# Patient Record
Sex: MALE | Race: WHITE | NOT HISPANIC OR LATINO | Employment: FULL TIME | URBAN - METROPOLITAN AREA
[De-identification: names, ages, dates, MRNs, and addresses within clinical notes are randomized per-mention and may not be internally consistent; named-entity substitution may affect disease eponyms.]

---

## 2020-10-09 ENCOUNTER — OFFICE VISIT (OUTPATIENT)
Dept: URGENT CARE | Facility: CLINIC | Age: 55
End: 2020-10-09
Payer: COMMERCIAL

## 2020-10-09 VITALS
OXYGEN SATURATION: 98 % | RESPIRATION RATE: 16 BRPM | HEART RATE: 115 BPM | WEIGHT: 178 LBS | HEIGHT: 70 IN | DIASTOLIC BLOOD PRESSURE: 90 MMHG | BODY MASS INDEX: 25.48 KG/M2 | TEMPERATURE: 98.6 F | SYSTOLIC BLOOD PRESSURE: 140 MMHG

## 2020-10-09 DIAGNOSIS — G89.18 ACUTE POST-OPERATIVE PAIN: ICD-10-CM

## 2020-10-09 PROCEDURE — 99203 OFFICE O/P NEW LOW 30 MIN: CPT | Performed by: NURSE PRACTITIONER

## 2020-10-09 RX ORDER — HYDROCODONE BITARTRATE AND ACETAMINOPHEN 5; 325 MG/1; MG/1
2 TABLET ORAL EVERY 6 HOURS PRN
Qty: 24 TAB | Refills: 0 | Status: SHIPPED | OUTPATIENT
Start: 2020-10-09 | End: 2020-10-12

## 2020-10-09 ASSESSMENT — ENCOUNTER SYMPTOMS
SENSORY CHANGE: 0
TINGLING: 0
FOCAL WEAKNESS: 0
BACK PAIN: 1

## 2020-10-09 NOTE — PROGRESS NOTES
Subjective:      Isma Garcia is a 55 y.o. male who presents with Back Pain (disk replacement, infusion, req pain meds)            HPI New. This is a 55 year old male with history of back surgery around 20 Sept in [place]. He is in between moves from [place] to [place] and has no PCP here yet. Has been having quite a bit of post procedural pain, especially when laying down. Denies bowel or bladder issues, pelvic pain or paresthesia. Denies weakness. Has been taking quite of bit of tylenol for this with minimal relief.  Patient has no known allergies.  Current Outpatient Medications on File Prior to Visit   Medication Sig Dispense Refill   • lisinopril (PRINIVIL) 20 MG TABS Take 1 Tab by mouth every day. 30 Each 0   • valacyclovir (VALTREX) 500 MG TABS Take 1 Tab by mouth every day. 30 Each 5     No current facility-administered medications on file prior to visit.      Social History     Socioeconomic History   • Marital status:      Spouse name: Not on file   • Number of children: 2   • Years of education: Not on file   • Highest education level: Not on file   Occupational History   • Occupation:      Employer: [employer]   Social Needs   • Financial resource strain: Not on file   • Food insecurity     Worry: Not on file     Inability: Not on file   • Transportation needs     Medical: Not on file     Non-medical: Not on file   Tobacco Use   • Smoking status: Never Smoker   • Smokeless tobacco: Never Used   Substance and Sexual Activity   • Alcohol use: No     Comment: occaisional   • Drug use: No   • Sexual activity: Yes   Lifestyle   • Physical activity     Days per week: Not on file     Minutes per session: Not on file   • Stress: Not on file   Relationships   • Social connections     Talks on phone: Not on file     Gets together: Not on file     Attends Denominational service: Not on file     Active member of club or organization: Not on file     Attends meetings of clubs or organizations: Not on file      "Relationship status: Not on file   • Intimate partner violence     Fear of current or ex partner: Not on file     Emotionally abused: Not on file     Physically abused: Not on file     Forced sexual activity: Not on file   Other Topics Concern   • Not on file   Social History Narrative   • Not on file     Breast Cancer-related family history is not on file.      Review of Systems   Genitourinary: Negative.    Musculoskeletal: Positive for back pain.   Neurological: Negative for tingling, sensory change and focal weakness.          Objective:     /90 (BP Location: Left arm, Patient Position: Sitting, BP Cuff Size: Adult)   Pulse (!) 115   Temp 37 °C (98.6 °F) (Temporal)   Resp 16   Ht 1.778 m (5' 10\")   Wt 80.7 kg (178 lb)   SpO2 98%   BMI 25.54 kg/m²      Physical Exam  Vitals signs and nursing note reviewed.   Constitutional:       General: He is not in acute distress.     Appearance: He is well-developed.   Cardiovascular:      Rate and Rhythm: Normal rate and regular rhythm.      Heart sounds: Normal heart sounds. No murmur.   Pulmonary:      Effort: Pulmonary effort is normal.      Breath sounds: Normal breath sounds.   Musculoskeletal:      Lumbar back: He exhibits decreased range of motion, tenderness and pain. He exhibits no swelling and no spasm.   Skin:     General: Skin is warm and dry.   Neurological:      General: No focal deficit present.      Mental Status: He is alert.      Sensory: No sensory deficit.      Motor: No abnormal muscle tone.      Gait: Gait normal.                 Assessment/Plan:        1. Acute post-operative pain  HYDROcodone-acetaminophen (NORCO) 5-325 MG Tab per tablet     Limited amount of norco for use.  Patient is cautioned on sedation potential of narcotic medication; no drinking, driving or operating heavy machinery while on this medication.  PDMP reviewed, no information.  Patient aware that after this we will not refill.  He has follow up with surgeon in 3 weeks. " (located in [place])

## 2021-03-15 DIAGNOSIS — Z23 NEED FOR VACCINATION: ICD-10-CM

## 2021-05-04 LAB — HBA1C MFR BLD: 8.7 % (ref 0–5.6)

## 2021-06-10 ENCOUNTER — APPOINTMENT (RX ONLY)
Dept: URBAN - METROPOLITAN AREA CLINIC 22 | Facility: CLINIC | Age: 56
Setting detail: DERMATOLOGY
End: 2021-06-10

## 2021-06-10 DIAGNOSIS — L81.4 OTHER MELANIN HYPERPIGMENTATION: ICD-10-CM

## 2021-06-10 DIAGNOSIS — L82.1 OTHER SEBORRHEIC KERATOSIS: ICD-10-CM

## 2021-06-10 DIAGNOSIS — D18.0 HEMANGIOMA: ICD-10-CM

## 2021-06-10 DIAGNOSIS — D22 MELANOCYTIC NEVI: ICD-10-CM

## 2021-06-10 DIAGNOSIS — Z85.828 PERSONAL HISTORY OF OTHER MALIGNANT NEOPLASM OF SKIN: ICD-10-CM

## 2021-06-10 DIAGNOSIS — L56.8 OTHER SPECIFIED ACUTE SKIN CHANGES DUE TO ULTRAVIOLET RADIATION: ICD-10-CM

## 2021-06-10 DIAGNOSIS — Z71.89 OTHER SPECIFIED COUNSELING: ICD-10-CM

## 2021-06-10 DIAGNOSIS — L57.0 ACTINIC KERATOSIS: ICD-10-CM

## 2021-06-10 DIAGNOSIS — L82.0 INFLAMED SEBORRHEIC KERATOSIS: ICD-10-CM

## 2021-06-10 PROBLEM — D22.5 MELANOCYTIC NEVI OF TRUNK: Status: ACTIVE | Noted: 2021-06-10

## 2021-06-10 PROBLEM — D18.01 HEMANGIOMA OF SKIN AND SUBCUTANEOUS TISSUE: Status: ACTIVE | Noted: 2021-06-10

## 2021-06-10 PROCEDURE — ? COUNSELING

## 2021-06-10 PROCEDURE — 17004 DESTROY PREMAL LESIONS 15/>: CPT

## 2021-06-10 PROCEDURE — ? LIQUID NITROGEN

## 2021-06-10 PROCEDURE — 17110 DESTRUCTION B9 LES UP TO 14: CPT | Mod: 59

## 2021-06-10 PROCEDURE — 99203 OFFICE O/P NEW LOW 30 MIN: CPT | Mod: 25

## 2021-06-10 PROCEDURE — ? SUNSCREEN RECOMMENDATIONS

## 2021-06-10 ASSESSMENT — LOCATION DETAILED DESCRIPTION DERM
LOCATION DETAILED: RIGHT MEDIAL ZYGOMA
LOCATION DETAILED: LEFT SUPERIOR UPPER BACK
LOCATION DETAILED: LEFT FOREHEAD
LOCATION DETAILED: RIGHT VENTRAL DISTAL FOREARM
LOCATION DETAILED: LEFT INFERIOR FOREHEAD
LOCATION DETAILED: LEFT ULNAR DORSAL HAND
LOCATION DETAILED: RIGHT SUPERIOR PARIETAL SCALP
LOCATION DETAILED: RIGHT ULNAR DORSAL HAND
LOCATION DETAILED: RIGHT CENTRAL FRONTAL SCALP
LOCATION DETAILED: RIGHT SUPERIOR MEDIAL UPPER BACK
LOCATION DETAILED: RIGHT SUPERIOR UPPER BACK
LOCATION DETAILED: LEFT CENTRAL FRONTAL SCALP
LOCATION DETAILED: LEFT SUPERIOR LATERAL UPPER BACK
LOCATION DETAILED: LEFT ANTERIOR DISTAL THIGH
LOCATION DETAILED: EPIGASTRIC SKIN
LOCATION DETAILED: LEFT POSTERIOR SHOULDER
LOCATION DETAILED: NASAL DORSUM
LOCATION DETAILED: LEFT SUPERIOR PARIETAL SCALP
LOCATION DETAILED: LEFT CENTRAL PARIETAL SCALP
LOCATION DETAILED: RIGHT RADIAL DORSAL HAND
LOCATION DETAILED: LEFT DISTAL DORSAL FOREARM
LOCATION DETAILED: LEFT LATERAL FOREHEAD
LOCATION DETAILED: 2ND WEB SPACE LEFT HAND
LOCATION DETAILED: RIGHT INFERIOR FOREHEAD
LOCATION DETAILED: LEFT SUPERIOR MEDIAL UPPER BACK
LOCATION DETAILED: RIGHT VENTRAL PROXIMAL FOREARM
LOCATION DETAILED: RIGHT SUPERIOR MEDIAL MIDBACK
LOCATION DETAILED: LEFT MEDIAL TRAPEZIAL NECK
LOCATION DETAILED: LEFT ANTERIOR SHOULDER
LOCATION DETAILED: RIGHT POSTERIOR SHOULDER

## 2021-06-10 ASSESSMENT — LOCATION ZONE DERM
LOCATION ZONE: HAND
LOCATION ZONE: NECK
LOCATION ZONE: TRUNK
LOCATION ZONE: ARM
LOCATION ZONE: FACE
LOCATION ZONE: NOSE
LOCATION ZONE: SCALP
LOCATION ZONE: LEG

## 2021-06-10 ASSESSMENT — LOCATION SIMPLE DESCRIPTION DERM
LOCATION SIMPLE: RIGHT ZYGOMA
LOCATION SIMPLE: ABDOMEN
LOCATION SIMPLE: RIGHT UPPER BACK
LOCATION SIMPLE: RIGHT HAND
LOCATION SIMPLE: LEFT HAND
LOCATION SIMPLE: LEFT FOREARM
LOCATION SIMPLE: RIGHT FOREHEAD
LOCATION SIMPLE: LEFT UPPER BACK
LOCATION SIMPLE: RIGHT SHOULDER
LOCATION SIMPLE: LEFT SCALP
LOCATION SIMPLE: RIGHT LOWER BACK
LOCATION SIMPLE: SCALP
LOCATION SIMPLE: POSTERIOR NECK
LOCATION SIMPLE: LEFT THIGH
LOCATION SIMPLE: LEFT FOREHEAD
LOCATION SIMPLE: RIGHT FOREARM
LOCATION SIMPLE: LEFT SHOULDER
LOCATION SIMPLE: RIGHT SCALP
LOCATION SIMPLE: NOSE

## 2021-06-10 ASSESSMENT — BSA RASH: BSA RASH: 50

## 2021-06-10 NOTE — HPI: EVALUATION OF SKIN LESION(S)
Appointment in May ok to fill per Md  
How Severe Are Your Spot(S)?: moderate
Have Your Spot(S) Been Treated In The Past?: has not been treated
Hpi Title: Evaluation of Skin Lesions

## 2021-06-10 NOTE — PROCEDURE: LIQUID NITROGEN
Render Note In Bullet Format When Appropriate: No
Detail Level: Detailed
Duration Of Freeze Thaw-Cycle (Seconds): 3
Number Of Freeze-Thaw Cycles: 2 freeze-thaw cycles
Post-Care Instructions: I reviewed with the patient in detail post-care instructions. Patient is to wear sunprotection, and avoid picking at any of the treated lesions. Pt may apply Vaseline to crusted or scabbing areas.
Consent: The patient's consent was obtained including but not limited to risks of crusting, scabbing, blistering, scarring, darker or lighter pigmentary change, recurrence, incomplete removal and infection.
Render Post-Care Instructions In Note?: yes
Number Of Freeze-Thaw Cycles: 3 freeze-thaw cycles
Medical Necessity Clause: This procedure was medically necessary because the lesions that were treated were:
Medical Necessity Information: It is in your best interest to select a reason for this procedure from the list below. All of these items fulfill various CMS LCD requirements except the new and changing color options.

## 2021-08-16 ENCOUNTER — TELEPHONE (OUTPATIENT)
Dept: SCHEDULING | Facility: IMAGING CENTER | Age: 56
End: 2021-08-16

## 2021-08-18 ENCOUNTER — TELEMEDICINE (OUTPATIENT)
Dept: MEDICAL GROUP | Facility: MEDICAL CENTER | Age: 56
End: 2021-08-18
Payer: COMMERCIAL

## 2021-08-18 VITALS — BODY MASS INDEX: 27.2 KG/M2 | HEIGHT: 70 IN | WEIGHT: 190 LBS

## 2021-08-18 DIAGNOSIS — G47.26 SHIFT WORK SLEEP DISORDER: ICD-10-CM

## 2021-08-18 DIAGNOSIS — E11.9 TYPE 2 DIABETES MELLITUS WITHOUT COMPLICATION, WITHOUT LONG-TERM CURRENT USE OF INSULIN (HCC): ICD-10-CM

## 2021-08-18 DIAGNOSIS — M19.012 PRIMARY OSTEOARTHRITIS OF BOTH SHOULDERS: ICD-10-CM

## 2021-08-18 DIAGNOSIS — M1A.0710 CHRONIC IDIOPATHIC GOUT INVOLVING TOE OF RIGHT FOOT WITHOUT TOPHUS: ICD-10-CM

## 2021-08-18 DIAGNOSIS — A60.00 GENITAL HERPES SIMPLEX, UNSPECIFIED SITE: ICD-10-CM

## 2021-08-18 DIAGNOSIS — N52.9 ERECTILE DYSFUNCTION, UNSPECIFIED ERECTILE DYSFUNCTION TYPE: ICD-10-CM

## 2021-08-18 DIAGNOSIS — M19.011 PRIMARY OSTEOARTHRITIS OF BOTH SHOULDERS: ICD-10-CM

## 2021-08-18 DIAGNOSIS — M54.42 CHRONIC MIDLINE LOW BACK PAIN WITH BILATERAL SCIATICA: ICD-10-CM

## 2021-08-18 DIAGNOSIS — G89.29 CHRONIC MIDLINE LOW BACK PAIN WITH BILATERAL SCIATICA: ICD-10-CM

## 2021-08-18 DIAGNOSIS — D75.1 POLYCYTHEMIA: ICD-10-CM

## 2021-08-18 DIAGNOSIS — M54.41 CHRONIC MIDLINE LOW BACK PAIN WITH BILATERAL SCIATICA: ICD-10-CM

## 2021-08-18 DIAGNOSIS — E83.110 HEREDITARY HEMOCHROMATOSIS (HCC): ICD-10-CM

## 2021-08-18 DIAGNOSIS — I10 ESSENTIAL HYPERTENSION, BENIGN: ICD-10-CM

## 2021-08-18 PROBLEM — M19.90 OSTEOARTHRITIS: Status: ACTIVE | Noted: 2021-08-18

## 2021-08-18 PROCEDURE — 99214 OFFICE O/P EST MOD 30 MIN: CPT | Mod: 95,CR | Performed by: INTERNAL MEDICINE

## 2021-08-18 RX ORDER — VALACYCLOVIR HYDROCHLORIDE 500 MG/1
500 TABLET, FILM COATED ORAL 2 TIMES DAILY
Qty: 90 TABLET | Refills: 0 | Status: SHIPPED | OUTPATIENT
Start: 2021-08-18 | End: 2022-08-10

## 2021-08-18 RX ORDER — MELOXICAM 15 MG/1
15 TABLET ORAL DAILY
COMMUNITY
End: 2021-08-18 | Stop reason: SDUPTHER

## 2021-08-18 RX ORDER — SILDENAFIL CITRATE 20 MG/1
TABLET ORAL
Qty: 90 TABLET | Refills: 0 | Status: SHIPPED | OUTPATIENT
Start: 2021-08-18 | End: 2021-09-10

## 2021-08-18 RX ORDER — LISINOPRIL 20 MG/1
20 TABLET ORAL DAILY
Qty: 90 TABLET | Refills: 0 | Status: SHIPPED | OUTPATIENT
Start: 2021-08-18 | End: 2021-09-10 | Stop reason: SDUPTHER

## 2021-08-18 RX ORDER — MODAFINIL 200 MG/1
200 TABLET ORAL
Qty: 10 TABLET | Refills: 0 | Status: SHIPPED | OUTPATIENT
Start: 2021-08-18 | End: 2021-08-28

## 2021-08-18 RX ORDER — MELOXICAM 15 MG/1
15 TABLET ORAL DAILY
Qty: 90 TABLET | Refills: 0 | Status: SHIPPED | OUTPATIENT
Start: 2021-08-18 | End: 2021-09-10

## 2021-08-18 RX ORDER — SILDENAFIL CITRATE 20 MG/1
TABLET ORAL
COMMUNITY
Start: 2021-06-27 | End: 2021-08-18 | Stop reason: SDUPTHER

## 2021-08-31 LAB
ALBUMIN SERPL-MCNC: 4.6 G/DL (ref 3.8–4.9)
ALBUMIN/CREAT UR: 4 MG/G CREAT (ref 0–29)
ALBUMIN/GLOB SERPL: 2 {RATIO} (ref 1.2–2.2)
ALP SERPL-CCNC: 65 IU/L (ref 48–121)
ALT SERPL-CCNC: 23 IU/L (ref 0–44)
AST SERPL-CCNC: 24 IU/L (ref 0–40)
BASOPHILS # BLD AUTO: 0 X10E3/UL (ref 0–0.2)
BASOPHILS NFR BLD AUTO: 1 %
BILIRUB SERPL-MCNC: 0.9 MG/DL (ref 0–1.2)
BUN SERPL-MCNC: 20 MG/DL (ref 6–24)
BUN/CREAT SERPL: 16 (ref 9–20)
CALCIUM SERPL-MCNC: 9.3 MG/DL (ref 8.7–10.2)
CHLORIDE SERPL-SCNC: 102 MMOL/L (ref 96–106)
CHOLEST SERPL-MCNC: 141 MG/DL (ref 100–199)
CO2 SERPL-SCNC: 22 MMOL/L (ref 20–29)
CREAT SERPL-MCNC: 1.29 MG/DL (ref 0.76–1.27)
CREAT UR-MCNC: 160.9 MG/DL
EOSINOPHIL # BLD AUTO: 0.2 X10E3/UL (ref 0–0.4)
EOSINOPHIL NFR BLD AUTO: 3 %
ERYTHROCYTE [DISTWIDTH] IN BLOOD BY AUTOMATED COUNT: 13.6 % (ref 11.6–15.4)
GLOBULIN SER CALC-MCNC: 2.3 G/DL (ref 1.5–4.5)
GLUCOSE SERPL-MCNC: 95 MG/DL (ref 65–99)
HBA1C MFR BLD: 5.5 % (ref 4.8–5.6)
HCT VFR BLD AUTO: 59 % (ref 37.5–51)
HDLC SERPL-MCNC: 54 MG/DL
HGB BLD-MCNC: 19.7 G/DL (ref 13–17.7)
IMM GRANULOCYTES # BLD AUTO: 0 X10E3/UL (ref 0–0.1)
IMM GRANULOCYTES NFR BLD AUTO: 1 %
IMMATURE CELLS  115398: ABNORMAL
LABORATORY COMMENT REPORT: NORMAL
LDLC SERPL CALC-MCNC: 72 MG/DL (ref 0–99)
LYMPHOCYTES # BLD AUTO: 1.2 X10E3/UL (ref 0.7–3.1)
LYMPHOCYTES NFR BLD AUTO: 25 %
MCH RBC QN AUTO: 34.7 PG (ref 26.6–33)
MCHC RBC AUTO-ENTMCNC: 33.4 G/DL (ref 31.5–35.7)
MCV RBC AUTO: 104 FL (ref 79–97)
MICROALBUMIN UR-MCNC: 6.2 UG/ML
MONOCYTES # BLD AUTO: 0.5 X10E3/UL (ref 0.1–0.9)
MONOCYTES NFR BLD AUTO: 11 %
MORPHOLOGY BLD-IMP: ABNORMAL
NEUTROPHILS # BLD AUTO: 2.9 X10E3/UL (ref 1.4–7)
NEUTROPHILS NFR BLD AUTO: 59 %
NRBC BLD AUTO-RTO: ABNORMAL %
PLATELET # BLD AUTO: 185 X10E3/UL (ref 150–450)
POTASSIUM SERPL-SCNC: 4.7 MMOL/L (ref 3.5–5.2)
PROT SERPL-MCNC: 6.9 G/DL (ref 6–8.5)
RBC # BLD AUTO: 5.67 X10E6/UL (ref 4.14–5.8)
SODIUM SERPL-SCNC: 140 MMOL/L (ref 134–144)
TRIGL SERPL-MCNC: 76 MG/DL (ref 0–149)
TSH SERPL DL<=0.005 MIU/L-ACNC: 2.3 UIU/ML (ref 0.45–4.5)
URATE SERPL-MCNC: 8.5 MG/DL (ref 3.8–8.4)
VLDLC SERPL CALC-MCNC: 15 MG/DL (ref 5–40)
WBC # BLD AUTO: 4.8 X10E3/UL (ref 3.4–10.8)

## 2021-09-10 ENCOUNTER — OFFICE VISIT (OUTPATIENT)
Dept: MEDICAL GROUP | Facility: MEDICAL CENTER | Age: 56
End: 2021-09-10
Payer: COMMERCIAL

## 2021-09-10 VITALS
DIASTOLIC BLOOD PRESSURE: 84 MMHG | TEMPERATURE: 97.7 F | OXYGEN SATURATION: 97 % | HEIGHT: 70 IN | BODY MASS INDEX: 27.69 KG/M2 | WEIGHT: 193.4 LBS | HEART RATE: 92 BPM | SYSTOLIC BLOOD PRESSURE: 114 MMHG

## 2021-09-10 DIAGNOSIS — N52.9 ERECTILE DYSFUNCTION, UNSPECIFIED ERECTILE DYSFUNCTION TYPE: ICD-10-CM

## 2021-09-10 DIAGNOSIS — R06.81 APNEA: ICD-10-CM

## 2021-09-10 DIAGNOSIS — D75.1 POLYCYTHEMIA: ICD-10-CM

## 2021-09-10 DIAGNOSIS — I10 ESSENTIAL HYPERTENSION, BENIGN: ICD-10-CM

## 2021-09-10 DIAGNOSIS — R79.89 ELEVATED SERUM CREATININE: ICD-10-CM

## 2021-09-10 DIAGNOSIS — M25.512 ACUTE PAIN OF LEFT SHOULDER: ICD-10-CM

## 2021-09-10 DIAGNOSIS — R06.83 LOUD SNORING: ICD-10-CM

## 2021-09-10 DIAGNOSIS — M1A.0710 CHRONIC IDIOPATHIC GOUT INVOLVING TOE OF RIGHT FOOT WITHOUT TOPHUS: ICD-10-CM

## 2021-09-10 DIAGNOSIS — E29.1 HYPOGONADISM, MALE: ICD-10-CM

## 2021-09-10 PROCEDURE — 99214 OFFICE O/P EST MOD 30 MIN: CPT | Performed by: INTERNAL MEDICINE

## 2021-09-10 RX ORDER — PREDNISONE 20 MG/1
40 TABLET ORAL DAILY
Qty: 10 TABLET | Refills: 0 | Status: SHIPPED | OUTPATIENT
Start: 2021-09-10 | End: 2021-09-15

## 2021-09-10 RX ORDER — ALLOPURINOL 100 MG/1
200 TABLET ORAL DAILY
Qty: 180 TABLET | Refills: 1 | Status: SHIPPED | OUTPATIENT
Start: 2021-09-10 | End: 2022-03-08

## 2021-09-10 RX ORDER — COLCHICINE 0.6 MG/1
0.6 TABLET ORAL DAILY
Qty: 90 TABLET | Refills: 1 | Status: SHIPPED | OUTPATIENT
Start: 2021-09-10 | End: 2022-03-08

## 2021-09-10 RX ORDER — LISINOPRIL 20 MG/1
20 TABLET ORAL DAILY
Qty: 90 TABLET | Refills: 3 | Status: SHIPPED | OUTPATIENT
Start: 2021-09-10 | End: 2022-08-05 | Stop reason: SDUPTHER

## 2021-09-10 RX ORDER — HYDROCODONE BITARTRATE AND ACETAMINOPHEN 5; 325 MG/1; MG/1
1 TABLET ORAL 2 TIMES DAILY PRN
Qty: 28 TABLET | Refills: 0 | Status: SHIPPED | OUTPATIENT
Start: 2021-09-10 | End: 2021-09-24

## 2021-09-10 RX ORDER — SILDENAFIL CITRATE 20 MG/1
TABLET ORAL
Qty: 90 TABLET | Refills: 3 | Status: SHIPPED | OUTPATIENT
Start: 2021-09-10 | End: 2024-01-05

## 2021-09-10 ASSESSMENT — FIBROSIS 4 INDEX: FIB4 SCORE: 1.54

## 2021-09-10 ASSESSMENT — PATIENT HEALTH QUESTIONNAIRE - PHQ9: CLINICAL INTERPRETATION OF PHQ2 SCORE: 0

## 2021-09-10 NOTE — PROGRESS NOTES
Established Patient    Isma presents today with the following:    CC: Lab review, shoulder pain    HPI:   Isma is a 56 y.o. male who came in for above.    He has significant polycythemia, hemoglobin 19.7, hematocrit 59.  Since he saw the result, he has been cutting down testosterone prescribed by Dr. Jose Lau.     He regularly gets phlebotomy for hemochromatosis, next 1 is in a month.  He currently does not have any symptoms of stasis.     He also have history of snoring and apnea noted by a nurse during an overnight stay at the hospital.  He was referred to sleep medicine but could not get in yet.    Elevated serum creatinine without reduced EGFR.  He was off meloxicam until he got to gout flareups recently.     His left shoulder osteoarthritis has been flaring up in recent intra-articular steroid injection did not work.  He needs shoulder replacement but currently reluctant to proceed with this.          ROS:   As above    Patient Active Problem List    Diagnosis Date Noted   • Hypogonadism, male 09/10/2021   • Primary osteoarthritis of both shoulders 08/18/2021   • Shift work sleep disorder 08/18/2021   • Genital herpes 08/18/2021   • Erectile dysfunction 01/10/2013   • Left anterior fascicular block 01/10/2013   • Essential hypertension, benign 01/10/2013   • Hereditary hemochromatosis (HCC) 09/19/2012   • Gout    • Chronic low back pain with bilateral sciatica    • History of basal cell carcinoma (BCC)        Current Outpatient Medications   Medication Sig Dispense Refill   • colchicine (COLCRYS) 0.6 MG Tab Take 1 Tablet by mouth every day. 90 Tablet 1   • allopurinol (ZYLOPRIM) 100 MG Tab Take 2 Tablets by mouth every day. 180 Tablet 1   • predniSONE (DELTASONE) 20 MG Tab Take 2 Tablets by mouth every day for 5 days. For gout flare up 10 Tablet 0   • HYDROcodone-acetaminophen (NORCO) 5-325 MG Tab per tablet Take 1 Tablet by mouth 2 times a day as needed for up to 14 days. 28 Tablet 0   •  "sildenafil (REVATIO) 20 MG tablet TAKE 2 TO 5 TABLETS ORALLY ONE HOUR PRIOR TO INTERCOURSE 90 Tablet 3   • lisinopril (PRINIVIL) 20 MG Tab Take 1 Tablet by mouth every day. 90 Tablet 3   • valACYclovir (VALTREX) 500 MG Tab Take 1 Tablet by mouth 2 times a day. 3- 5 DAYS FOR EACH HERPES FLARE-UP 90 Tablet 0     No current facility-administered medications for this visit.         /84 (BP Location: Left arm, Patient Position: Sitting, BP Cuff Size: Adult)   Pulse 92   Temp 36.5 °C (97.7 °F) (Temporal)   Ht 1.778 m (5' 10\")   Wt 87.7 kg (193 lb 6.4 oz)   SpO2 97%   BMI 27.75 kg/m²     Physical Exam  General: Alert and oriented, No apparent distress.  Eyes: Pupils are equal and reactive. No scleral icterus.  Neck: Supple. No cervical or supraclavicular lymphadenopathy noted. Thyroid not enlarged.  Neck circumference 16 \"  Lungs: Clear to auscultation bilaterally without any wheezing, crepitations.  Cardiovascular: Regular rate and rhythm. No murmurs, rubs or gallops.  Abdomen: Bowel sound +, soft, non tender, no rebound or guarding, no palpable organomegaly  Extremities: No edema.  Skin: No rash or suspicious skin lesions noted.  Neuro: A & O x 4. Normal speech and delayed in word finding.        Assessment and Plan    1. Polycythemia  Recommended to start aspirin 81 mg daily.  Screen for sleep apnea..  -  CBC WITH DIFFERENTIAL; Future    2. Apnea  3. Loud snoring  -We will order home sleep study.     4. Hypogonadism, male  - Continue with reduced dose of testosterone.      5. Chronic idiopathic gout involving toe of right foot without tophus  Uric acid level not at target.  We will start allopurinol.  Due to recent frequent flareups, start colchicine to avoid flareup when starting allopurinol.  Avoid NSAIDs for now.  If there is flareup, use prednisone for 5 days.  Possible side effects of these medications discussed.  - colchicine (COLCRYS) 0.6 MG Tab; Take 1 Tablet by mouth every day.  Dispense: 90 Tablet; " Refill: 1  - allopurinol (ZYLOPRIM) 100 MG Tab; Take 2 Tablets by mouth every day.  Dispense: 180 Tablet; Refill: 1  - predniSONE (DELTASONE) 20 MG Tab; Take 2 Tablets by mouth every day for 5 days. For gout flare up  Dispense: 10 Tablet; Refill: 0  - Comp Metabolic Panel; Future  - URIC ACID; Future    6. Elevated serum creatinine  -Stop meloxicam. Recheck in 3 months.    7. Acute pain of left shoulder  Due to elevated creatinine, inadequate relief with Tylenol or intra-articular injection, try short-term course of HYDROcodone-acetaminophen (NORCO) 5-325 MG Tab per tablet; Take 1 Tablet by mouth 2 times a day as needed for up to 14 days.  Dispense: 28 Tablet; Refill: 0  -Discussed side effects of opiate medications.    8. Essential hypertension, benign  - stable. refilled lisinopril (PRINIVIL) 20 MG Tab; Take 1 Tablet by mouth every day.  Dispense: 90 Tablet; Refill: 3    9. Erectile dysfunction, unspecified erectile dysfunction type  - worsening. Treat possible underlying cause such as CLOVIS.  refilled sildenafil (REVATIO) 20 MG tablet; TAKE 2 TO 5 TABLETS ORALLY ONE HOUR PRIOR TO INTERCOURSE  Dispense: 90 Tablet; Refill: 3        Return in about 3 months (around 12/10/2021).         Signed by: Maria Elena Latif M.D.

## 2021-11-16 DIAGNOSIS — G47.33 SEVERE OBSTRUCTIVE SLEEP APNEA: ICD-10-CM

## 2022-02-08 ENCOUNTER — OFFICE VISIT (OUTPATIENT)
Dept: SLEEP MEDICINE | Facility: MEDICAL CENTER | Age: 57
End: 2022-02-08
Payer: COMMERCIAL

## 2022-02-08 VITALS
HEIGHT: 70 IN | BODY MASS INDEX: 27.92 KG/M2 | RESPIRATION RATE: 16 BRPM | OXYGEN SATURATION: 95 % | HEART RATE: 104 BPM | DIASTOLIC BLOOD PRESSURE: 82 MMHG | SYSTOLIC BLOOD PRESSURE: 124 MMHG | WEIGHT: 195 LBS

## 2022-02-08 DIAGNOSIS — G47.33 OSA (OBSTRUCTIVE SLEEP APNEA): ICD-10-CM

## 2022-02-08 PROCEDURE — 99203 OFFICE O/P NEW LOW 30 MIN: CPT | Performed by: FAMILY MEDICINE

## 2022-02-08 ASSESSMENT — PATIENT HEALTH QUESTIONNAIRE - PHQ9: CLINICAL INTERPRETATION OF PHQ2 SCORE: 0

## 2022-02-08 ASSESSMENT — FIBROSIS 4 INDEX: FIB4 SCORE: 1.54

## 2022-02-08 NOTE — PROGRESS NOTES
"  [facility]  Consult Note     Date: 2/8/2022 / Time: 1:34 PM    Patient ID:   Name:             Isma Garcia   YOB: 1965  Age:                 56 y.o.  male   MRN:               [id number]      Thank you for requesting a sleep medicine consultation on Isma Garcia at the sleep center. He presents today with the chief complaints of snoring, gasping  and pause in breathing. occasional excessive daytime sleepiness. He is referred by Dr. Latif for evaluation and treatment of sleep disorder breathing.  He had a home sleep study on 11/8/2021 which showed severe sleep apnea with AHI of 49.9/h with O2 arian of 78%.  However he is currently not on any therapy.    HISTORY OF PRESENT ILLNESS:       At night,  Isma Garcia goes to bed around 9 pm on weekdays and  on the weekends. He gets out of bed at 6:30 am on weekdays and on the weekends.  He averages about 8 hrs of sleep on a good night and 6-7 hrs on a bad night. Pt has bad nights are rare per week. He falls asleep within few to 40 minutes. He wakes up about 1 times during the night due to bathroom use and on average It takes him few min to fall back asleep.He is aware of snoring,breathing pauses and gasping in sleep.  He  denies any symptoms of restless legs syndrome such as an \"urge to move\"  He  legs in the evening or bedtime. He  denies any symptoms of narcolepsy such as sleep paralysis or cataplexy, or any symptoms to suggest parasomnias such as sleep walking or acting out of dreams. He  has not used any medications for the sleep problem.  Overall,he doesnot finds his sleep refreshing. In terms of  excessive daytime sleepiness,he denies of sleepiness while  at work, while reading or watching TV or while driving. Sidney sleepiness scale score is 9/24.He does take regular naps which is 20 mins long.He drinks about 2 caffeinated beverages per day.      REVIEW OF SYSTEMS:       Constitutional: Denies fevers, Denies weight " changes  Eyes: Denies changes in vision, no eye pain  Ears/Nose/Throat/Mouth: Denies nasal congestion or sore throat   Cardiovascular: Denies chest pain or palpitations   Respiratory: Denies shortness of breath , Denies cough  Gastrointestinal/Hepatic: Denies abdominal pain, nausea, vomiting, diarrhea, constipation or GI bleeding   Genitourinary: Denies bladder dysfunction, dysuria or frequency  Musculoskeletal/Rheum: Denies  joint pain and swelling   Skin/Breast: Denies rash  Neurological: Denies headache, confusion, memory loss or focal weakness/parasthesias  Psychiatric: denies mood disorder     Comprehensive review of systems form is reviewed with the patient and is attached in the EMR.     PMH:  has a past medical history of BCC (basal cell carcinoma), face, Cancer (HCC), Chickenpox, Chronic low back pain, ED (erectile dysfunction), Genital herpes, Gout, Gout, HTN, Insomnia, and Shoulder pain. He also has no past medical history of [place] measles.  MEDS:   Current Outpatient Medications:   •  allopurinol (ZYLOPRIM) 100 MG Tab, Take 2 Tablets by mouth every day., Disp: 180 Tablet, Rfl: 1  •  sildenafil (REVATIO) 20 MG tablet, TAKE 2 TO 5 TABLETS ORALLY ONE HOUR PRIOR TO INTERCOURSE, Disp: 90 Tablet, Rfl: 3  •  lisinopril (PRINIVIL) 20 MG Tab, Take 1 Tablet by mouth every day., Disp: 90 Tablet, Rfl: 3  •  valACYclovir (VALTREX) 500 MG Tab, Take 1 Tablet by mouth 2 times a day. 3- 5 DAYS FOR EACH HERPES FLARE-UP, Disp: 90 Tablet, Rfl: 0  •  colchicine (COLCRYS) 0.6 MG Tab, Take 1 Tablet by mouth every day., Disp: 90 Tablet, Rfl: 1  ALLERGIES: No Known Allergies  SURGHX:   Past Surgical History:   Procedure Laterality Date   • RECOVERY  9/19/2012    Performed by [facility]   • NASAL RECONSTRUCTION  7/6/2009    Performed by CRISTINA MANZO at [facility]   • NASAL RECONSTRUCTION  12/1/08    Performed by CRISTINA [facility]ZO at [facility]   •  "NASAL RECONSTRUCTION  10/20/08    Performed by CRISTINA [facility]ZO at [facility]   • FLAP GRAFT  10/20/08    Performed by CRISTINA MANZO at [facility]   • NASAL RECONSTRUCTION  7/7/08    Performed by CRISTINA MANZO at [facility]   • LESION EXCISION GENERAL  5/12/08    Performed by CRISTINA MANZO at [facility]   • LAMINOTOMY     • TONSILLECTOMY       SOCHX:  reports that he has never smoked. He has never used smokeless tobacco. He reports that he does not drink alcohol and does not use drugs.   FH:   Family History   Problem Relation Age of Onset   • Cancer Father 78        colon   • Hypertension Father    • Diabetes Sister         Type 1 DM   • Sleep Apnea Neg Hx        Physical Exam:  Vitals/ General Appearance:   Weight/BMI: Body mass index is 27.98 kg/m².  /82 (BP Location: Left arm, Patient Position: Sitting, BP Cuff Size: Adult)   Pulse (!) 104   Resp 16   Ht 1.778 m (5' 10\")   Wt 88.5 kg (195 lb)   SpO2 95%   Vitals:    02/08/22 1327   BP: 124/82   BP Location: Left arm   Patient Position: Sitting   BP Cuff Size: Adult   Pulse: (!) 104   Resp: 16   SpO2: 95%   Weight: 88.5 kg (195 lb)   Height: 1.778 m (5' 10\")           Constitutional: Alert, no distress, well-groomed.  Skin: No rashes in visible areas.  Eye: Round. Conjunctiva clear, lids normal. No icterus.   ENMT: Lips pink without lesions, good dentition, moist mucous membranes. Phonation normal.  Neck: No masses, no thyromegaly. Moves freely without pain.  CV: Pulse as reported by patient  Respiratory: Unlabored respiratory effort, no cough or audible wheeze  Psych: Alert and oriented x3, normal affect and mood.   INVESTIGATIONS:       ASSESSMENT AND PLAN     1.Obstructive Sleep Apnea     The pathophysiology of sleep anea and the increased risk of cardiovascular morbidity from untreated sleep apnea is discussed in detail with the patient.He is urged to avoid supine sleep, " weight gain and alcoholic beverages since all of these can worsen sleep apnea. He is cautioned against drowsy driving. If He feels sleepy while driving, He must pull over for a break/nap, rather than persist on the road, in the interest of He own safety and that of others on the road.   Plan   - Auto CPAP vs overnight CPAP titration vs dental appliance and surgeries including hypoglossal nerve stimulator was dicussed in detail. After informed discussion ACPAP 5-15 cm    - F/u in 8-10 weeks to assess the efficiacy of recommended pressure    - HST was reviewed and discussed with the pt   - Compliance was reinforced     2. Regarding treatment of other past medical problems and general health maintenance,  He is urged to follow up with PCP.

## 2022-03-05 DIAGNOSIS — M1A.0710 CHRONIC IDIOPATHIC GOUT INVOLVING TOE OF RIGHT FOOT WITHOUT TOPHUS: ICD-10-CM

## 2022-03-08 RX ORDER — COLCHICINE 0.6 MG/1
0.6 TABLET ORAL DAILY
Qty: 90 TABLET | Refills: 0 | Status: SHIPPED | OUTPATIENT
Start: 2022-03-08 | End: 2022-06-07

## 2022-03-08 RX ORDER — ALLOPURINOL 100 MG/1
200 TABLET ORAL DAILY
Qty: 180 TABLET | Refills: 0 | Status: SHIPPED | OUTPATIENT
Start: 2022-03-08 | End: 2022-06-07

## 2022-03-14 ENCOUNTER — PATIENT MESSAGE (OUTPATIENT)
Dept: MEDICAL GROUP | Facility: MEDICAL CENTER | Age: 57
End: 2022-03-14
Payer: COMMERCIAL

## 2022-03-14 DIAGNOSIS — M10.9 ACUTE GOUT, UNSPECIFIED CAUSE, UNSPECIFIED SITE: ICD-10-CM

## 2022-03-15 ENCOUNTER — APPOINTMENT (OUTPATIENT)
Dept: MEDICAL GROUP | Facility: MEDICAL CENTER | Age: 57
End: 2022-03-15
Payer: COMMERCIAL

## 2022-03-15 RX ORDER — INDOMETHACIN 50 MG/1
50 CAPSULE ORAL 3 TIMES DAILY PRN
Qty: 60 CAPSULE | Refills: 1 | Status: SHIPPED | OUTPATIENT
Start: 2022-03-15 | End: 2022-08-05 | Stop reason: SDUPTHER

## 2022-07-18 ENCOUNTER — APPOINTMENT (OUTPATIENT)
Dept: SLEEP MEDICINE | Facility: MEDICAL CENTER | Age: 57
End: 2022-07-18
Payer: COMMERCIAL

## 2022-07-19 ENCOUNTER — TELEMEDICINE (OUTPATIENT)
Dept: SLEEP MEDICINE | Facility: MEDICAL CENTER | Age: 57
End: 2022-07-19
Payer: COMMERCIAL

## 2022-07-19 VITALS — HEIGHT: 70 IN | WEIGHT: 200 LBS | BODY MASS INDEX: 28.63 KG/M2

## 2022-07-19 DIAGNOSIS — G47.33 OSA (OBSTRUCTIVE SLEEP APNEA): ICD-10-CM

## 2022-07-19 PROCEDURE — 99213 OFFICE O/P EST LOW 20 MIN: CPT | Mod: 95 | Performed by: FAMILY MEDICINE

## 2022-07-19 ASSESSMENT — PATIENT HEALTH QUESTIONNAIRE - PHQ9: CLINICAL INTERPRETATION OF PHQ2 SCORE: 0

## 2022-07-19 ASSESSMENT — FIBROSIS 4 INDEX: FIB4 SCORE: 1.57

## 2022-07-19 NOTE — PROGRESS NOTES
Telemedicine Visit: Established Patient   This evaluation was conducted via Zoom using secure and encrypted videoconferencing technology. The patient was in their home in the state of [place].    The patient's identity was confirmed and verbal consent was obtained for this virtual visit.     [facility] Follow Up Note     Date: 7/19/2022 / Time: 10:36 AM    Patient ID:   Name:             Isma Garcia   YOB: 1965  Age:                 57 y.o.  male   MRN:               [id number]      Thank you for requesting a sleep medicine consultation on Isma Garcia at the sleep center. He presents today with the chief complaints of CLOVIS follow up.     HISTORY OF PRESENT ILLNESS:       Pt is currently on ACPAP 5-15 cm. Overall,he doesnot finds his sleep refreshing.He denies any symptoms of RLS, narcolepsy or any symptoms to suggest parasomnias such as nightmares, sleep walking or acting out of dreams. He is using CPAP most days of the week. Pt reports 6 hrs of average nightly use of CPAP. Pt denies snoring, gasping,choking.Pt also denies significant mask leak that is interfering with sleep. The 30 day compliance was downloaded which shows adequate compliance with more that 4 hr usage about 73%. The AHI is has improved to 2.2/hr. The mask leak is normal. He has hard time using due to mask leak and mask intolerance. It has been disturbing sleep more than before therefore he wanted to discuss alternative therapies for CLOVIS.     SLEEP HISTORY   He had a home sleep study on 11/8/2021 which showed severe sleep apnea with AHI of 49.9/h with O2 arian of 78%.        REVIEW OF SYSTEMS:       Constitutional: Denies fevers, Denies weight changes  Eyes: Denies changes in vision, no eye pain  Ears/Nose/Throat/Mouth: Denies nasal congestion or sore throat   Cardiovascular: Denies chest pain or palpitations   Respiratory: Denies shortness of breath , Denies cough  Gastrointestinal/Hepatic: Denies abdominal  pain, nausea, vomiting, diarrhea, constipation or GI bleeding   Genitourinary: Deniesdysuria or frequency  Musculoskeletal/Rheum: Denies  joint pain and swelling   Skin/Breast: Denies rash,   Neurological: Denies headache, confusion, memory loss or focal weakness/parasthesias  Psychiatric: denies mood disorder     Comprehensive review of systems form is reviewed with the patient and is attached in the EMR.     PMH:  has a past medical history of BCC (basal cell carcinoma), face, Cancer (HCC), Chickenpox, Chronic low back pain, ED (erectile dysfunction), Genital herpes, Gout, Gout, HTN, Insomnia, and Shoulder pain.    He has no past medical history of [place] measles.  MEDS:   Current Outpatient Medications:   •  indomethacin (INDOCIN) 50 MG Cap, Take 1 Capsule by mouth 3 times a day as needed (gout pain). Take with food., Disp: 60 Capsule, Rfl: 1  •  allopurinol (ZYLOPRIM) 100 MG Tab, TAKE 2 TABLETS BY MOUTH EVERY DAY, Disp: 180 Tablet, Rfl: 1  •  colchicine (COLCRYS) 0.6 MG Tab, TAKE 1 TABLET BY MOUTH EVERY DAY, Disp: 90 Tablet, Rfl: 1  •  sildenafil (REVATIO) 20 MG tablet, TAKE 2 TO 5 TABLETS ORALLY ONE HOUR PRIOR TO INTERCOURSE, Disp: 90 Tablet, Rfl: 3  •  lisinopril (PRINIVIL) 20 MG Tab, Take 1 Tablet by mouth every day., Disp: 90 Tablet, Rfl: 3  •  valACYclovir (VALTREX) 500 MG Tab, Take 1 Tablet by mouth 2 times a day. 3- 5 DAYS FOR EACH HERPES FLARE-UP, Disp: 90 Tablet, Rfl: 0  ALLERGIES: No Known Allergies  SURGHX:   Past Surgical History:   Procedure Laterality Date   • RECOVERY  9/19/2012    Performed by [facility]   • NASAL RECONSTRUCTION  7/6/2009    Performed by CRISTINA MANZO at [facility]   • NASAL RECONSTRUCTION  12/1/08    Performed by CRISTINA MANZO at [facility]   • NASAL RECONSTRUCTION  10/20/08    [facility]   • FLAP GRAFT  10/20/08    Performed by CRISTINA MANZO at  [facility]   • NASAL RECONSTRUCTION  7/7/08    Performed by CRISTINA MANZO at [facility]   • LESION EXCISION GENERAL  5/12/08    Performed by CRISTINA MANZO at [facility]   • LAMINOTOMY     • TONSILLECTOMY       SOCHX:  reports that he has never smoked. He has never used smokeless tobacco. He reports that he does not drink alcohol and does not use drugs..  FH:   Family History   Problem Relation Age of Onset   • Cancer Father 78        colon   • Hypertension Father    • Diabetes Sister         Type 1 DM   • Sleep Apnea Neg Hx          Physical Exam:  Vitals/ General Appearance:   Weight/BMI: There is no height or weight on file to calculate BMI.  There were no vitals taken for this visit.  There were no vitals filed for this visit.    Pt. is alert and oriented to time, place and person. Cooperative and in no apparent distress.       Constitutional: Alert, no distress, well-groomed.  Skin: No rashes in visible areas.  Eye: Round. Conjunctiva clear, lids normal. No icterus.   ENMT: Lips pink without lesions, good dentition, moist mucous membranes. Phonation normal.  Neck: No masses, no thyromegaly. Moves freely without pain.  CV: Pulse as reported by patient  Respiratory: Unlabored respiratory effort, no cough or audible wheeze  Psych: Alert and oriented x3, normal affect and mood.     ASSESSMENT AND PLAN   1.Obstructive Sleep Apnea .   He is urged to avoid supine sleep, weight gain and alcoholic beverages since all of these can worsen sleep apnea. He is cautioned against drowsy driving. If He feels sleepy while driving, He must pull over for a break/nap, rather than persist on the road, in the interest of He own safety and that of others on the road.   Plan   - Continue ACPAP 5-15 cm    - Due CPAP intolerance, other treatment options were in detail including OAT and ENT surgeries. After detailed discussion referral is please for hypoglossal nerve stimulator.  Risks and benefits were discussed in detail.   - Compliance download was reviewed and discussed with the pt   - Compliance was reinforced     2. Regarding treatment of other past medical problems and general health maintenance,  He is urged to follow up with PCP.

## 2022-07-30 LAB
ALBUMIN SERPL-MCNC: 4.2 G/DL (ref 3.8–4.9)
ALBUMIN/GLOB SERPL: 1.8 {RATIO} (ref 1.2–2.2)
ALP SERPL-CCNC: 65 IU/L (ref 44–121)
ALT SERPL-CCNC: 27 IU/L (ref 0–44)
AST SERPL-CCNC: 26 IU/L (ref 0–40)
BASOPHILS # BLD AUTO: 0 X10E3/UL (ref 0–0.2)
BASOPHILS NFR BLD AUTO: 1 %
BILIRUB SERPL-MCNC: 0.8 MG/DL (ref 0–1.2)
BUN SERPL-MCNC: 16 MG/DL (ref 6–24)
BUN/CREAT SERPL: 14 (ref 9–20)
CALCIUM SERPL-MCNC: 9.4 MG/DL (ref 8.7–10.2)
CHLORIDE SERPL-SCNC: 102 MMOL/L (ref 96–106)
CO2 SERPL-SCNC: 22 MMOL/L (ref 20–29)
CREAT SERPL-MCNC: 1.16 MG/DL (ref 0.76–1.27)
EGFRCR SERPLBLD CKD-EPI 2021: 73 ML/MIN/1.73
EOSINOPHIL # BLD AUTO: 0.2 X10E3/UL (ref 0–0.4)
EOSINOPHIL NFR BLD AUTO: 4 %
ERYTHROCYTE [DISTWIDTH] IN BLOOD BY AUTOMATED COUNT: 12.5 % (ref 11.6–15.4)
GLOBULIN SER CALC-MCNC: 2.3 G/DL (ref 1.5–4.5)
GLUCOSE SERPL-MCNC: 83 MG/DL (ref 65–99)
HCT VFR BLD AUTO: 58.6 % (ref 37.5–51)
HGB BLD-MCNC: 19.9 G/DL (ref 13–17.7)
IMM GRANULOCYTES # BLD AUTO: 0 X10E3/UL (ref 0–0.1)
IMM GRANULOCYTES NFR BLD AUTO: 1 %
IMMATURE CELLS  115398: ABNORMAL
LYMPHOCYTES # BLD AUTO: 0.9 X10E3/UL (ref 0.7–3.1)
LYMPHOCYTES NFR BLD AUTO: 23 %
MCH RBC QN AUTO: 35.4 PG (ref 26.6–33)
MCHC RBC AUTO-ENTMCNC: 34 G/DL (ref 31.5–35.7)
MCV RBC AUTO: 104 FL (ref 79–97)
MONOCYTES # BLD AUTO: 0.5 X10E3/UL (ref 0.1–0.9)
MONOCYTES NFR BLD AUTO: 12 %
MORPHOLOGY BLD-IMP: ABNORMAL
NEUTROPHILS # BLD AUTO: 2.5 X10E3/UL (ref 1.4–7)
NEUTROPHILS NFR BLD AUTO: 59 %
NRBC BLD AUTO-RTO: ABNORMAL %
PLATELET # BLD AUTO: 179 X10E3/UL (ref 150–450)
POTASSIUM SERPL-SCNC: 4.5 MMOL/L (ref 3.5–5.2)
PROT SERPL-MCNC: 6.5 G/DL (ref 6–8.5)
RBC # BLD AUTO: 5.62 X10E6/UL (ref 4.14–5.8)
SODIUM SERPL-SCNC: 139 MMOL/L (ref 134–144)
URATE SERPL-MCNC: 7.6 MG/DL (ref 3.8–8.4)
WBC # BLD AUTO: 4 X10E3/UL (ref 3.4–10.8)

## 2022-08-05 ENCOUNTER — OFFICE VISIT (OUTPATIENT)
Dept: MEDICAL GROUP | Facility: MEDICAL CENTER | Age: 57
End: 2022-08-05
Payer: COMMERCIAL

## 2022-08-05 VITALS
HEART RATE: 102 BPM | SYSTOLIC BLOOD PRESSURE: 118 MMHG | BODY MASS INDEX: 28.26 KG/M2 | TEMPERATURE: 97.5 F | WEIGHT: 197.4 LBS | OXYGEN SATURATION: 96 % | DIASTOLIC BLOOD PRESSURE: 78 MMHG | HEIGHT: 70 IN

## 2022-08-05 DIAGNOSIS — Z11.59 NEED FOR HEPATITIS C SCREENING TEST: ICD-10-CM

## 2022-08-05 DIAGNOSIS — E83.110 HEREDITARY HEMOCHROMATOSIS (HCC): ICD-10-CM

## 2022-08-05 DIAGNOSIS — M1A.0710 CHRONIC IDIOPATHIC GOUT INVOLVING TOE OF RIGHT FOOT WITHOUT TOPHUS: ICD-10-CM

## 2022-08-05 DIAGNOSIS — Z00.00 WELL ADULT EXAM: ICD-10-CM

## 2022-08-05 DIAGNOSIS — I10 ESSENTIAL HYPERTENSION, BENIGN: ICD-10-CM

## 2022-08-05 DIAGNOSIS — M25.521 RIGHT ELBOW PAIN: ICD-10-CM

## 2022-08-05 DIAGNOSIS — D75.1 POLYCYTHEMIA: ICD-10-CM

## 2022-08-05 DIAGNOSIS — G47.33 OSA (OBSTRUCTIVE SLEEP APNEA): ICD-10-CM

## 2022-08-05 DIAGNOSIS — M10.9 ACUTE GOUT, UNSPECIFIED CAUSE, UNSPECIFIED SITE: ICD-10-CM

## 2022-08-05 DIAGNOSIS — Z23 NEED FOR VACCINATION: ICD-10-CM

## 2022-08-05 DIAGNOSIS — M19.012 PRIMARY OSTEOARTHRITIS OF LEFT SHOULDER: ICD-10-CM

## 2022-08-05 PROCEDURE — 90471 IMMUNIZATION ADMIN: CPT | Performed by: INTERNAL MEDICINE

## 2022-08-05 PROCEDURE — 90714 TD VACC NO PRESV 7 YRS+ IM: CPT | Performed by: INTERNAL MEDICINE

## 2022-08-05 PROCEDURE — 99214 OFFICE O/P EST MOD 30 MIN: CPT | Mod: 25 | Performed by: INTERNAL MEDICINE

## 2022-08-05 RX ORDER — ALLOPURINOL 300 MG/1
300 TABLET ORAL DAILY
Qty: 90 TABLET | Refills: 3 | Status: SHIPPED | OUTPATIENT
Start: 2022-08-05 | End: 2023-07-19

## 2022-08-05 RX ORDER — COLCHICINE 0.6 MG/1
0.6 TABLET ORAL DAILY
Qty: 90 TABLET | Refills: 1 | Status: SHIPPED | OUTPATIENT
Start: 2022-08-05 | End: 2023-03-31

## 2022-08-05 RX ORDER — LISINOPRIL 20 MG/1
20 TABLET ORAL DAILY
Qty: 90 TABLET | Refills: 3 | Status: SHIPPED | OUTPATIENT
Start: 2022-08-05 | End: 2023-07-18

## 2022-08-05 RX ORDER — INDOMETHACIN 50 MG/1
50 CAPSULE ORAL 3 TIMES DAILY PRN
Qty: 60 CAPSULE | Refills: 1 | Status: SHIPPED | OUTPATIENT
Start: 2022-08-05 | End: 2022-11-28 | Stop reason: SDUPTHER

## 2022-08-05 ASSESSMENT — FIBROSIS 4 INDEX: FIB4 SCORE: 1.62

## 2022-08-05 NOTE — PROGRESS NOTES
Established Patient    Isma presents today with the following:    CC: Follow-up for chronic medical problems    HPI:   Isma is a 57 y.o. male who came in for above.    He has been doing okay.  He struggles with CPAP mask, could not sleep, sometimes due to claustrophobia. He is looking into getting implantable device.    He has 2 gout attacks since last visit here.  He responded to indomethacin.  Uric acid is not at target yet although it is improving.    He has been having right lateral elbow pain off-and-on. He does lift weight and flips weight depending on what he needs to do during exercises.    ROS:   As above    Patient Active Problem List    Diagnosis Date Noted   • Hypogonadism, male 09/10/2021   • Primary osteoarthritis of both shoulders 08/18/2021   • Shift work sleep disorder 08/18/2021   • Genital herpes 08/18/2021   • Erectile dysfunction 01/10/2013   • Left anterior fascicular block 01/10/2013   • Essential hypertension, benign 01/10/2013   • Hereditary hemochromatosis (HCC) 09/19/2012   • Gout    • Chronic low back pain with bilateral sciatica    • History of basal cell carcinoma (BCC)        Current Outpatient Medications   Medication Sig Dispense Refill   • allopurinol (ZYLOPRIM) 300 MG Tab Take 1 Tablet by mouth every day. 90 Tablet 3   • lisinopril (PRINIVIL) 20 MG Tab Take 1 Tablet by mouth every day. 90 Tablet 3   • colchicine (COLCRYS) 0.6 MG Tab Take 1 Tablet by mouth every day. 90 Tablet 1   • indomethacin (INDOCIN) 50 MG Cap Take 1 Capsule by mouth 3 times a day as needed (gout pain). Take with food. 60 Capsule 1   • sildenafil (REVATIO) 20 MG tablet TAKE 2 TO 5 TABLETS ORALLY ONE HOUR PRIOR TO INTERCOURSE 90 Tablet 3   • valACYclovir (VALTREX) 500 MG Tab Take 1 Tablet by mouth 2 times a day. 3- 5 DAYS FOR EACH HERPES FLARE-UP 90 Tablet 0     No current facility-administered medications for this visit.         /78 (BP Location: Left arm, Patient Position: Sitting, BP Cuff  "Size: Adult)   Pulse (!) 102   Temp 36.4 °C (97.5 °F) (Temporal)   Ht 1.778 m (5' 10\")   Wt 89.5 kg (197 lb 6.4 oz)   SpO2 96%   BMI 28.32 kg/m²     Physical Exam  General: Alert and oriented, No apparent distress.  .  Neck: Supple. No cervical or supraclavicular lymphadenopathy noted. Thyroid not enlarged.  Lungs: Clear to auscultation bilaterally without any wheezing, crepitations.  Cardiovascular: Regular rate and rhythm. No murmurs, rubs or gallops.  Abdomen: Bowel sound +, soft, non tender   Extremities: No  edema.       Assessment and Plan    1. Chronic idiopathic gout involving toe of right foot without tophus  Increase allopurinol to 300 mg daily.  Continue colchicine for now until uric acid is in the target and he has less flares  - allopurinol (ZYLOPRIM) 300 MG Tab; Take 1 Tablet by mouth every day.  Dispense: 90 Tablet; Refill: 3  - colchicine (COLCRYS) 0.6 MG Tab; Take 1 Tablet by mouth every day.  Dispense: 90 Tablet; Refill: 1  - URIC ACID; Future    2. Acute gout, unspecified cause, unspecified site  - refilled indomethacin (INDOCIN) 50 MG Cap; Take 1 Capsule by mouth 3 times a day as needed (gout pain). Take with food.  Dispense: 60 Capsule; Refill: 1    3. Polycythemia  Discuss high hematocrit. He is using testosterone 7 mL/1400 mg weekly which could be the cause.  His testosterone placement therapy is managed by another doctor.  I will review his recent level and recommend to reduce the dose.   - CBC WITH DIFFERENTIAL; Future    4. Right elbow pain  - exam consistent with elbow tendinopathy.  Recommended rest for 2 weeks.  Compression sleeve, topicals and ice for relief.  -If no improvement, will refer to PT    5. Essential hypertension, benign  HR is high due to caffeine intake.  BP controlled  - lisinopril (PRINIVIL) 20 MG Tab; Take 1 Tablet by mouth every day.  Dispense: 90 Tablet; Refill: 3    6. Primary osteoarthritis of left shoulder  His left shoulder is gradually worse. He would like " to check with ortho for stem cell therapy or shoulder replacement.  - Referral to Orthopedics    7. CLOVIS (obstructive sleep apnea)  - f/b sleep    8. Well adult exam  - Comp Metabolic Panel; Future  - Lipid Profile; Future  - TSH WITH REFLEX TO FT4; Future    9. Need for vaccination  - TD Preservative Free =>6yo IM    10. Need for hepatitis C screening test  - HCV Scrn ( [id number] 1xLife); Future    11. Hereditary hemochromatosis (HCC)  - FERRITIN; Future  - IRON/TOTAL IRON BIND; Future        Return in about 6 months (around 2023).       Signed by: Maria Elena Latif M.D.

## 2022-08-10 DIAGNOSIS — A60.00 GENITAL HERPES SIMPLEX, UNSPECIFIED SITE: ICD-10-CM

## 2022-08-10 RX ORDER — VALACYCLOVIR HYDROCHLORIDE 500 MG/1
TABLET, FILM COATED ORAL
Qty: 90 TABLET | Refills: 0 | Status: SHIPPED | OUTPATIENT
Start: 2022-08-10 | End: 2022-12-13

## 2022-10-06 ENCOUNTER — APPOINTMENT (OUTPATIENT)
Dept: ADMISSIONS | Facility: MEDICAL CENTER | Age: 57
End: 2022-10-06
Payer: COMMERCIAL

## 2022-10-07 ENCOUNTER — PRE-ADMISSION TESTING (OUTPATIENT)
Dept: ADMISSIONS | Facility: MEDICAL CENTER | Age: 57
End: 2022-10-07
Payer: COMMERCIAL

## 2022-10-07 ASSESSMENT — FIBROSIS 4 INDEX: FIB4 SCORE: 1.62

## 2022-11-09 ENCOUNTER — PRE-ADMISSION TESTING (OUTPATIENT)
Dept: ADMISSIONS | Facility: MEDICAL CENTER | Age: 57
End: 2022-11-09
Attending: OTOLARYNGOLOGY
Payer: COMMERCIAL

## 2022-11-09 DIAGNOSIS — Z01.812 PRE-OPERATIVE LABORATORY EXAMINATION: ICD-10-CM

## 2022-11-09 LAB
ANION GAP SERPL CALC-SCNC: 10 MMOL/L (ref 7–16)
BUN SERPL-MCNC: 16 MG/DL (ref 8–22)
CALCIUM SERPL-MCNC: 9.5 MG/DL (ref 8.5–10.5)
CHLORIDE SERPL-SCNC: 103 MMOL/L (ref 96–112)
CO2 SERPL-SCNC: 27 MMOL/L (ref 20–33)
CREAT SERPL-MCNC: 1.22 MG/DL (ref 0.5–1.4)
GFR SERPLBLD CREATININE-BSD FMLA CKD-EPI: 69 ML/MIN/1.73 M 2
GLUCOSE SERPL-MCNC: 98 MG/DL (ref 65–99)
POTASSIUM SERPL-SCNC: 4.7 MMOL/L (ref 3.6–5.5)
SODIUM SERPL-SCNC: 140 MMOL/L (ref 135–145)

## 2022-11-09 PROCEDURE — 36415 COLL VENOUS BLD VENIPUNCTURE: CPT

## 2022-11-09 PROCEDURE — 80048 BASIC METABOLIC PNL TOTAL CA: CPT

## 2022-11-09 ASSESSMENT — FIBROSIS 4 INDEX: FIB4 SCORE: 1.62

## 2022-11-26 ENCOUNTER — ANESTHESIA EVENT (OUTPATIENT)
Dept: SURGERY | Facility: MEDICAL CENTER | Age: 57
End: 2022-11-26
Payer: COMMERCIAL

## 2022-11-28 ENCOUNTER — PATIENT MESSAGE (OUTPATIENT)
Dept: MEDICAL GROUP | Facility: MEDICAL CENTER | Age: 57
End: 2022-11-28

## 2022-11-28 ENCOUNTER — HOSPITAL ENCOUNTER (OUTPATIENT)
Facility: MEDICAL CENTER | Age: 57
End: 2022-11-28
Attending: OTOLARYNGOLOGY | Admitting: OTOLARYNGOLOGY
Payer: COMMERCIAL

## 2022-11-28 ENCOUNTER — ANESTHESIA (OUTPATIENT)
Dept: SURGERY | Facility: MEDICAL CENTER | Age: 57
End: 2022-11-28
Payer: COMMERCIAL

## 2022-11-28 VITALS
OXYGEN SATURATION: 94 % | TEMPERATURE: 97 F | RESPIRATION RATE: 15 BRPM | HEIGHT: 70 IN | HEART RATE: 83 BPM | DIASTOLIC BLOOD PRESSURE: 90 MMHG | BODY MASS INDEX: 27.84 KG/M2 | WEIGHT: 194.45 LBS | SYSTOLIC BLOOD PRESSURE: 134 MMHG

## 2022-11-28 DIAGNOSIS — M10.9 ACUTE GOUT, UNSPECIFIED CAUSE, UNSPECIFIED SITE: ICD-10-CM

## 2022-11-28 PROCEDURE — 700102 HCHG RX REV CODE 250 W/ 637 OVERRIDE(OP): Performed by: OTOLARYNGOLOGY

## 2022-11-28 PROCEDURE — 160009 HCHG ANES TIME/MIN: Performed by: OTOLARYNGOLOGY

## 2022-11-28 PROCEDURE — 160002 HCHG RECOVERY MINUTES (STAT): Performed by: OTOLARYNGOLOGY

## 2022-11-28 PROCEDURE — 160025 RECOVERY II MINUTES (STATS): Performed by: OTOLARYNGOLOGY

## 2022-11-28 PROCEDURE — A9270 NON-COVERED ITEM OR SERVICE: HCPCS | Performed by: OTOLARYNGOLOGY

## 2022-11-28 PROCEDURE — 160035 HCHG PACU - 1ST 60 MINS PHASE I: Performed by: OTOLARYNGOLOGY

## 2022-11-28 PROCEDURE — 160028 HCHG SURGERY MINUTES - 1ST 30 MINS LEVEL 3: Performed by: OTOLARYNGOLOGY

## 2022-11-28 PROCEDURE — 160048 HCHG OR STATISTICAL LEVEL 1-5: Performed by: OTOLARYNGOLOGY

## 2022-11-28 PROCEDURE — 700111 HCHG RX REV CODE 636 W/ 250 OVERRIDE (IP): Performed by: ANESTHESIOLOGY

## 2022-11-28 PROCEDURE — 700105 HCHG RX REV CODE 258: Performed by: OTOLARYNGOLOGY

## 2022-11-28 PROCEDURE — 160046 HCHG PACU - 1ST 60 MINS PHASE II: Performed by: OTOLARYNGOLOGY

## 2022-11-28 PROCEDURE — 00170 ANES INTRAORAL PX NOS: CPT | Performed by: ANESTHESIOLOGY

## 2022-11-28 RX ORDER — HALOPERIDOL 5 MG/ML
1 INJECTION INTRAMUSCULAR
Status: DISCONTINUED | OUTPATIENT
Start: 2022-11-28 | End: 2022-11-28 | Stop reason: HOSPADM

## 2022-11-28 RX ORDER — METOCLOPRAMIDE HYDROCHLORIDE 5 MG/ML
INJECTION INTRAMUSCULAR; INTRAVENOUS PRN
Status: DISCONTINUED | OUTPATIENT
Start: 2022-11-28 | End: 2022-11-28 | Stop reason: SURG

## 2022-11-28 RX ORDER — ONDANSETRON 2 MG/ML
4 INJECTION INTRAMUSCULAR; INTRAVENOUS
Status: DISCONTINUED | OUTPATIENT
Start: 2022-11-28 | End: 2022-11-28 | Stop reason: HOSPADM

## 2022-11-28 RX ORDER — DEXAMETHASONE SODIUM PHOSPHATE 4 MG/ML
INJECTION, SOLUTION INTRA-ARTICULAR; INTRALESIONAL; INTRAMUSCULAR; INTRAVENOUS; SOFT TISSUE PRN
Status: DISCONTINUED | OUTPATIENT
Start: 2022-11-28 | End: 2022-11-28 | Stop reason: SURG

## 2022-11-28 RX ORDER — KETOROLAC TROMETHAMINE 30 MG/ML
30 INJECTION, SOLUTION INTRAMUSCULAR; INTRAVENOUS ONCE
Status: COMPLETED | OUTPATIENT
Start: 2022-11-28 | End: 2022-11-28

## 2022-11-28 RX ORDER — DIPHENHYDRAMINE HYDROCHLORIDE 50 MG/ML
12.5 INJECTION INTRAMUSCULAR; INTRAVENOUS
Status: DISCONTINUED | OUTPATIENT
Start: 2022-11-28 | End: 2022-11-28 | Stop reason: HOSPADM

## 2022-11-28 RX ORDER — OXYMETAZOLINE HYDROCHLORIDE 0.05 G/100ML
SPRAY NASAL
Status: DISCONTINUED
Start: 2022-11-28 | End: 2022-11-28 | Stop reason: HOSPADM

## 2022-11-28 RX ORDER — SODIUM CHLORIDE, SODIUM LACTATE, POTASSIUM CHLORIDE, CALCIUM CHLORIDE 600; 310; 30; 20 MG/100ML; MG/100ML; MG/100ML; MG/100ML
INJECTION, SOLUTION INTRAVENOUS CONTINUOUS
Status: DISCONTINUED | OUTPATIENT
Start: 2022-11-28 | End: 2022-11-28 | Stop reason: HOSPADM

## 2022-11-28 RX ORDER — LIDOCAINE HYDROCHLORIDE 40 MG/ML
SOLUTION TOPICAL
Status: DISCONTINUED
Start: 2022-11-28 | End: 2022-11-28 | Stop reason: HOSPADM

## 2022-11-28 RX ORDER — OXYMETAZOLINE HYDROCHLORIDE 0.05 G/100ML
2 SPRAY NASAL
Status: COMPLETED | OUTPATIENT
Start: 2022-11-28 | End: 2022-11-28

## 2022-11-28 RX ORDER — SODIUM CHLORIDE, SODIUM LACTATE, POTASSIUM CHLORIDE, CALCIUM CHLORIDE 600; 310; 30; 20 MG/100ML; MG/100ML; MG/100ML; MG/100ML
INJECTION, SOLUTION INTRAVENOUS CONTINUOUS
Status: DISCONTINUED | OUTPATIENT
Start: 2022-11-28 | End: 2022-11-28

## 2022-11-28 RX ADMIN — PROPOFOL 60 MG: 10 INJECTION, EMULSION INTRAVENOUS at 07:30

## 2022-11-28 RX ADMIN — PROPOFOL 100 MCG/KG/MIN: 10 INJECTION, EMULSION INTRAVENOUS at 07:31

## 2022-11-28 RX ADMIN — KETOROLAC TROMETHAMINE 30 MG: 30 INJECTION, SOLUTION INTRAMUSCULAR at 08:07

## 2022-11-28 RX ADMIN — OXYMETAZOLINE HCL 2 SPRAY: 0.05 SPRAY NASAL at 06:44

## 2022-11-28 RX ADMIN — DEXAMETHASONE SODIUM PHOSPHATE 10 MG: 4 INJECTION, SOLUTION INTRA-ARTICULAR; INTRALESIONAL; INTRAMUSCULAR; INTRAVENOUS; SOFT TISSUE at 07:28

## 2022-11-28 RX ADMIN — METOCLOPRAMIDE 10 MG: 5 INJECTION, SOLUTION INTRAMUSCULAR; INTRAVENOUS at 07:27

## 2022-11-28 RX ADMIN — SODIUM CHLORIDE, POTASSIUM CHLORIDE, SODIUM LACTATE AND CALCIUM CHLORIDE: 600; 310; 30; 20 INJECTION, SOLUTION INTRAVENOUS at 06:45

## 2022-11-28 ASSESSMENT — PAIN DESCRIPTION - PAIN TYPE
TYPE: CHRONIC PAIN

## 2022-11-28 ASSESSMENT — FIBROSIS 4 INDEX: FIB4 SCORE: 1.62

## 2022-11-28 ASSESSMENT — PAIN SCALES - GENERAL: PAIN_LEVEL: 0

## 2022-11-28 NOTE — DISCHARGE INSTRUCTIONS
If any questions arise, call your provider.  If your provider is not available, please feel free to call the Surgical Center at [phone].    MEDICATIONS: Resume taking daily medication.  Take prescribed pain medication with food.  If no medication is prescribed, you may take non-aspirin pain medication if needed.  PAIN MEDICATION CAN BE VERY CONSTIPATING.  Take a stool softener or laxative such as senokot, pericolace, or milk of magnesia if needed.    Last pain medication given at: Toradol ( like ibuprofen/Motrin)  at 8:07 am.     What to Expect Post Anesthesia    Rest and take it easy for the first 24 hours.  A responsible adult is recommended to remain with you during that time.  It is normal to feel sleepy.  We encourage you to not do anything that requires balance, judgment or coordination.    FOR 24 HOURS DO NOT:  Drive, operate machinery or run household appliances.  Drink beer or alcoholic beverages.  Make important decisions or sign legal documents.    To avoid nausea, slowly advance diet as tolerated, avoiding spicy or greasy foods for the first day.  Add more substantial food to your diet according to your provider's instructions.  INCREASE FLUIDS AND FIBER TO AVOID CONSTIPATION.    MILD FLU-LIKE SYMPTOMS ARE NORMAL.  YOU MAY EXPERIENCE GENERALIZED MUSCLE ACHES, THROAT IRRITATION, HEADACHE AND/OR SOME NAUSEA.

## 2022-11-28 NOTE — OR NURSING
0743 Patient arrived from OR to PACU 9. Connected to monitor and report received from anesthesia and RN. VSS. 2 L 02 via nasal canula. Breaths calm, even, unlabored.       0800: Pt reporting R foot pain related to gout 6/10. Order received from Dr. Garcia for Toradol.     0807: Toradol given per order.     0810: phase 1 complete.     0817: Updated friend, Vivienne via phone on pt status and plan of care. Discharge instructions provided; all questions answered.     0830: Pt up to restroom and dressed. Ambulating steady.    0841: PIV removed intact.     0900: Pt escorted out with all personal belonging by RN. Pt ambulated out steady.

## 2022-11-28 NOTE — ANESTHESIA POSTPROCEDURE EVALUATION
Patient: Isma Garcia    Procedure Summary     Date: 11/28/22 Room / Location: [facility] 28 / SURGERY SAME DAY [facility]    Anesthesia Start: 0727 Anesthesia Stop: 0751    Procedure: EVALUATION OF SLEEP DISORDERED BREATHING BY EXAMINATION OF UPPER AIRWAY USING ENDOSCOPE (Throat) Diagnosis: (OBSTRUCTIVE SLEEP APNEA)    Surgeons: Kamar Cormier M.D. Responsible Provider: Paulette Garcia M.D.    Anesthesia Type: general ASA Status: 2          Final Anesthesia Type: general  Last vitals  BP   Blood Pressure: 112/75    Temp   36.1 °C (97 °F)    Pulse   94   Resp   14    SpO2   96 %      Anesthesia Post Evaluation    Patient location during evaluation: PACU  Patient participation: complete - patient participated  Level of consciousness: awake and alert  Pain score: 0    Airway patency: patent  Anesthetic complications: no  Cardiovascular status: hemodynamically stable  Respiratory status: acceptable  Hydration status: euvolemic    PONV: none          No notable events documented.     Nurse Pain Score: 0 (NPRS)

## 2022-11-28 NOTE — ADDENDUM NOTE
Addendum  created 11/28/22 0828 by Paulette Garcia M.D.    Order list changed, Pharmacy for encounter modified

## 2022-11-28 NOTE — ANESTHESIA PREPROCEDURE EVALUATION
Case: [id number] Date/Time: 11/28/22 0715    Procedure: EVALUATION OF SLEEP DISORDERED BREATHING BY EXAMINATION OF UPPER AIRWAY USING ENDOSCOPE    Pre-op diagnosis: OBSTRUCTIVE SLEEP APNEA    Location: [facility] ROOM 28 / SURGERY SAME DAY [facility]    Surgeons: Kamar Cormier M.D.          Relevant Problems   CARDIAC   (positive) Essential hypertension, benign   (positive) Left anterior fascicular block      Other   (positive) Chronic low back pain with bilateral sciatica   (positive) Hereditary hemochromatosis (HCC)       Physical Exam    Airway   Mallampati: II  TM distance: >3 FB  Neck ROM: full       Cardiovascular - normal exam  Rhythm: regular  Rate: normal  (-) murmur     Dental - normal exam           Pulmonary - normal exam  Breath sounds clear to auscultation     Abdominal   (+) obese     Neurological - normal exam                 Anesthesia Plan    ASA 2       Plan - general       Airway plan will be mask          Induction: intravenous          Informed Consent:    Anesthetic plan and risks discussed with patient.

## 2022-11-28 NOTE — OP REPORT
DATE OF SERVICE: 11/28/22     PREOPERATIVE DIAGNOSES:  Obstructive sleep apnea with positive pressure   intolerance and persistent sleep apnea after CPAP use.     POSTOPERATIVE DIAGNOSES:  Obstructive sleep apnea with positive pressure   intolerance and persistent sleep apnea after CPAP use.     PROCEDURE PERFORMED:  Drug-induced sleep endoscopy/flexible laryngoscopy with   examination under anesthesia.     ANESTHESIA TYPE:  IV sedation.     ESTIMATED BLOOD LOSS:  None.     COMPLICATIONS:  None.     BRIEF CLINICAL HISTORY:  Patient presents with a history of   moderate to severe symptomatic obstructive sleep apnea who is intolerant and   unable to achieve benefit with positive pressure therapy.  He presents today   for drug-induced sleep endoscopy to better characterize her localizations and   pattern of obstruction to predict appropriate medical and/or surgical options   moving forward.     PROCEDURAL FINDINGS:  There was no evidence of a complete concentric palatal   obstruction and he does appear to be a candidate anatomically for hypoglossal   nerve stimulation therapy.     DESCRIPTION OF PROCEDURE:  The patient was brought to the operating room and   was anesthetized via the standard drug-induced sleep endoscopy protocol.  The   propofol infusion rate was started at 75 mcg and increased to 100 at which   point conditions that mimic sleep were gradually observed.  Under these   conditions, a flexible scope was inserted to examine both sides of the nasal   cavity as well as the pharynx and larynx.  The VOTE score at baseline was   complete anterior posterior collapse with simulated jaw advancement and tongue   advancement.  The hypopharyngeal obstruction and secondarily the palatal   collapse also improved.  In summary, there is no evidence of a complete   concentric palatal obstruction and he does appear to be a candidate   anatomically for hypoglossal nerve stimulation therapy.  I was present for and    performed the entire procedure today.           ______________________________  Kamar Cormier MD

## 2022-11-28 NOTE — ANESTHESIA TIME REPORT
Anesthesia Start and Stop Event Times     Date Time Event    11/28/2022 0624 Ready for Procedure     0727 Anesthesia Start     0751 Anesthesia Stop        Responsible Staff  11/28/22    Name Role Begin End    Paulette Garcia M.D. Anesth 0727 075        Overtime Reason:  no overtime (within assigned shift)    Comments:

## 2022-11-29 RX ORDER — INDOMETHACIN 50 MG/1
50 CAPSULE ORAL 3 TIMES DAILY PRN
Qty: 60 CAPSULE | Refills: 1 | Status: SHIPPED | OUTPATIENT
Start: 2022-11-29

## 2022-12-11 DIAGNOSIS — A60.00 GENITAL HERPES SIMPLEX, UNSPECIFIED SITE: ICD-10-CM

## 2022-12-13 RX ORDER — VALACYCLOVIR HYDROCHLORIDE 500 MG/1
TABLET, FILM COATED ORAL
Qty: 90 TABLET | Refills: 0 | Status: SHIPPED | OUTPATIENT
Start: 2022-12-13 | End: 2023-03-24

## 2023-01-09 ENCOUNTER — HOSPITAL ENCOUNTER (OUTPATIENT)
Facility: MEDICAL CENTER | Age: 58
End: 2023-01-09
Attending: OTOLARYNGOLOGY | Admitting: OTOLARYNGOLOGY
Payer: COMMERCIAL

## 2023-03-17 ENCOUNTER — OFFICE VISIT (OUTPATIENT)
Dept: URGENT CARE | Facility: CLINIC | Age: 58
End: 2023-03-17
Payer: COMMERCIAL

## 2023-03-17 VITALS
OXYGEN SATURATION: 96 % | HEIGHT: 70 IN | TEMPERATURE: 98.1 F | RESPIRATION RATE: 16 BRPM | WEIGHT: 190 LBS | DIASTOLIC BLOOD PRESSURE: 64 MMHG | SYSTOLIC BLOOD PRESSURE: 132 MMHG | HEART RATE: 87 BPM | BODY MASS INDEX: 27.2 KG/M2

## 2023-03-17 DIAGNOSIS — M62.830 SPASM OF THORACOLUMBAR MUSCLE: ICD-10-CM

## 2023-03-17 PROCEDURE — 99213 OFFICE O/P EST LOW 20 MIN: CPT | Performed by: NURSE PRACTITIONER

## 2023-03-17 RX ORDER — PREDNISONE 20 MG/1
TABLET ORAL
Qty: 10 TABLET | Refills: 0 | Status: SHIPPED | OUTPATIENT
Start: 2023-03-17 | End: 2023-06-01

## 2023-03-17 RX ORDER — TRIAMCINOLONE ACETONIDE 1 MG/G
CREAM TOPICAL
COMMUNITY
Start: 2023-03-09

## 2023-03-17 RX ORDER — CYCLOBENZAPRINE HCL 10 MG
10 TABLET ORAL 3 TIMES DAILY PRN
Qty: 20 TABLET | Refills: 0 | Status: SHIPPED | OUTPATIENT
Start: 2023-03-17 | End: 2023-06-01

## 2023-03-17 ASSESSMENT — ENCOUNTER SYMPTOMS: MUSCLE SPASMS: 1

## 2023-03-17 ASSESSMENT — FIBROSIS 4 INDEX: FIB4 SCORE: 1.62

## 2023-03-17 NOTE — PROGRESS NOTES
Subjective     Lorne Garcia is a 57 y.o. male who presents with Spasms (X3 days, muscle spasms upper back area )            Spasms  This is a new problem. Episode onset: pt reports new onset of left upper back muscle spasm that started 3 days ago. he states it is located just below his left shoulder blade. becomes more stiff when he has to sit or lie down for a while. he tries to stay active to avoid stiffness. He has tried acetaminophen for the symptoms. The treatment provided no relief.     Review of Systems   Musculoskeletal:  Positive for muscle spasms.        Left upper back spasm   All other systems reviewed and are negative.       Past Medical History:   Diagnosis Date    Arthritis 11/09/2022    shoulders    BCC (basal cell carcinoma), face     Cancer (HCC)     Chickenpox     Chronic low back pain     ED (erectile dysfunction)     Genital herpes     Gout     HTN     Insomnia     Shoulder pain       Past Surgical History:   Procedure Laterality Date    UT DISE DYN EVAL SLEEP DISORDERED BREATHING FLX DX  11/28/2022    Procedure: EVALUATION OF SLEEP DISORDERED BREATHING BY EXAMINATION OF UPPER AIRWAY USING ENDOSCOPE;  Surgeon: Kamar Cormier M.D.;  Location: SURGERY SAME DAY [facility];  Service: Ent    RECOVERY  09/19/2012    Performed by [facility] at SURGERY SAME DAY [facility] ORS    NASAL RECONSTRUCTION  07/06/2009    Performed by CRISTINA MANZO at SURGERY SAME DAY [facility] ORS    NASAL RECONSTRUCTION  12/01/2008    Performed by CRISTINA MANZO at SURGERY SAME DAY [facility] ORS    NASAL RECONSTRUCTION  10/20/2008    Performed by CRISTINA MANZO at SURGERY SAME DAY [facility] ORS    FLAP GRAFT  10/20/2008    Performed by CRISTINA MANZO at SURGERY SAME DAY [facility] ORS    NASAL RECONSTRUCTION  07/07/2008    Performed by CRISTINA MANZO at SURGERY SAME DAY [facility] ORS    LESION EXCISION GENERAL  05/12/2008    Performed by CRISTINA MANZO at SURGERY SAME DAY [facility] ORS    LAMINOTOMY      LUMBAR FUSION  "ANTERIOR      TONSILLECTOMY        Social History     Socioeconomic History    Marital status:      Spouse name: Not on file    Number of children: 2    Years of education: Not on file    Highest education level: Not on file   Occupational History    Occupation:      Employer: [employer]   Tobacco Use    Smoking status: Never    Smokeless tobacco: Never   Vaping Use    Vaping Use: Never used   Substance and Sexual Activity    Alcohol use: No    Drug use: No    Sexual activity: Yes     Comment: . 2 grown sons   Other Topics Concern    Not on file   Social History Narrative          Social Determinants of Health     Financial Resource Strain: Not on file   Food Insecurity: Not on file   Transportation Needs: Not on file   Physical Activity: Not on file   Stress: Not on file   Social Connections: Not on file   Intimate Partner Violence: Not on file   Housing Stability: Not on file         Objective     /64   Pulse 87   Temp 36.7 °C (98.1 °F) (Temporal)   Resp 16   Ht 1.778 m (5' 10\")   Wt 86.2 kg (190 lb)   SpO2 96%   BMI 27.26 kg/m²      Physical Exam  Vitals and nursing note reviewed.   Constitutional:       Appearance: Normal appearance.   HENT:      Head: Normocephalic and atraumatic.      Nose: Nose normal.      Mouth/Throat:      Mouth: Mucous membranes are moist.      Pharynx: Oropharynx is clear.   Eyes:      Extraocular Movements: Extraocular movements intact.      Pupils: Pupils are equal, round, and reactive to light.   Cardiovascular:      Rate and Rhythm: Normal rate and regular rhythm.   Pulmonary:      Effort: Pulmonary effort is normal.       Musculoskeletal:         General: Normal range of motion.      Cervical back: Normal range of motion and neck supple.   Skin:     General: Skin is warm and dry.      Capillary Refill: Capillary refill takes less than 2 seconds.   Neurological:      General: No focal deficit present.      Mental Status: He is alert and oriented to " person, place, and time. Mental status is at baseline.   Psychiatric:         Mood and Affect: Mood normal.         Thought Content: Thought content normal.         Judgment: Judgment normal.                           Assessment & Plan        1. Spasm of thoracolumbar muscle  - cyclobenzaprine (FLEXERIL) 10 mg Tab; Take 1 Tablet by mouth 3 times a day as needed for Muscle Spasms.  Dispense: 20 Tablet; Refill: 0  - predniSONE (DELTASONE) 20 MG Tab; Take 2 tabs PO daily for 5 days  Dispense: 10 Tablet; Refill: 0            Sedating effects of flexeril discussed  He cannot take NSAIDs due to recently getting stem cell tx  He said he would check to see if he can take steroids for pain relief  Encouraged warm compresses TID, encouraged activity  Supportive care, differential diagnoses, and indications for immediate follow-up discussed with patient.    Pathogenesis of diagnosis discussed including typical length and natural progression.    Instructed to return to  or nearest emergency department if symptoms fail to improve, for any change in condition, further concerns, or new concerning symptoms.  Patient states understanding of the plan of care and discharge instructions.

## 2023-03-31 DIAGNOSIS — M1A.0710 CHRONIC IDIOPATHIC GOUT INVOLVING TOE OF RIGHT FOOT WITHOUT TOPHUS: ICD-10-CM

## 2023-03-31 RX ORDER — COLCHICINE 0.6 MG/1
0.6 TABLET ORAL DAILY
Qty: 90 TABLET | Refills: 1 | Status: SHIPPED | OUTPATIENT
Start: 2023-03-31 | End: 2024-01-05 | Stop reason: SDUPTHER

## 2023-05-11 ENCOUNTER — APPOINTMENT (OUTPATIENT)
Dept: ADMISSIONS | Facility: MEDICAL CENTER | Age: 58
End: 2023-05-11
Attending: OTOLARYNGOLOGY
Payer: COMMERCIAL

## 2023-06-01 ENCOUNTER — PRE-ADMISSION TESTING (OUTPATIENT)
Dept: ADMISSIONS | Facility: MEDICAL CENTER | Age: 58
End: 2023-06-01
Attending: OTOLARYNGOLOGY
Payer: COMMERCIAL

## 2023-06-08 ENCOUNTER — PRE-ADMISSION TESTING (OUTPATIENT)
Dept: ADMISSIONS | Facility: MEDICAL CENTER | Age: 58
End: 2023-06-08
Attending: OTOLARYNGOLOGY
Payer: COMMERCIAL

## 2023-06-08 DIAGNOSIS — Z01.812 PRE-OPERATIVE LABORATORY EXAMINATION: ICD-10-CM

## 2023-06-08 LAB
ALBUMIN SERPL BCP-MCNC: 4.4 G/DL (ref 3.2–4.9)
ALBUMIN/GLOB SERPL: 1.8 G/DL
ALP SERPL-CCNC: 67 U/L (ref 30–99)
ALT SERPL-CCNC: 31 U/L (ref 2–50)
ANION GAP SERPL CALC-SCNC: 12 MMOL/L (ref 7–16)
AST SERPL-CCNC: 35 U/L (ref 12–45)
BILIRUB SERPL-MCNC: 1.1 MG/DL (ref 0.1–1.5)
BUN SERPL-MCNC: 19 MG/DL (ref 8–22)
CALCIUM ALBUM COR SERPL-MCNC: 8.9 MG/DL (ref 8.5–10.5)
CALCIUM SERPL-MCNC: 9.2 MG/DL (ref 8.5–10.5)
CHLORIDE SERPL-SCNC: 103 MMOL/L (ref 96–112)
CO2 SERPL-SCNC: 24 MMOL/L (ref 20–33)
CREAT SERPL-MCNC: 1.12 MG/DL (ref 0.5–1.4)
ERYTHROCYTE [DISTWIDTH] IN BLOOD BY AUTOMATED COUNT: 51.8 FL (ref 35.9–50)
GFR SERPLBLD CREATININE-BSD FMLA CKD-EPI: 76 ML/MIN/1.73 M 2
GLOBULIN SER CALC-MCNC: 2.4 G/DL (ref 1.9–3.5)
GLUCOSE SERPL-MCNC: 97 MG/DL (ref 65–99)
HCT VFR BLD AUTO: 51.6 % (ref 42–52)
HGB BLD-MCNC: 18 G/DL (ref 14–18)
MCH RBC QN AUTO: 35.8 PG (ref 27–33)
MCHC RBC AUTO-ENTMCNC: 34.9 G/DL (ref 32.3–36.5)
MCV RBC AUTO: 102.6 FL (ref 81.4–97.8)
PLATELET # BLD AUTO: 169 K/UL (ref 164–446)
PMV BLD AUTO: 9.8 FL (ref 9–12.9)
POTASSIUM SERPL-SCNC: 5 MMOL/L (ref 3.6–5.5)
PROT SERPL-MCNC: 6.8 G/DL (ref 6–8.2)
RBC # BLD AUTO: 5.03 M/UL (ref 4.7–6.1)
SODIUM SERPL-SCNC: 139 MMOL/L (ref 135–145)
WBC # BLD AUTO: 4.2 K/UL (ref 4.8–10.8)

## 2023-06-08 PROCEDURE — 36415 COLL VENOUS BLD VENIPUNCTURE: CPT

## 2023-06-08 PROCEDURE — 80053 COMPREHEN METABOLIC PANEL: CPT

## 2023-06-08 PROCEDURE — 85027 COMPLETE CBC AUTOMATED: CPT

## 2023-06-19 ENCOUNTER — APPOINTMENT (OUTPATIENT)
Dept: RADIOLOGY | Facility: MEDICAL CENTER | Age: 58
End: 2023-06-19
Attending: OTOLARYNGOLOGY
Payer: COMMERCIAL

## 2023-06-19 ENCOUNTER — ANESTHESIA (OUTPATIENT)
Dept: SURGERY | Facility: MEDICAL CENTER | Age: 58
End: 2023-06-19
Payer: COMMERCIAL

## 2023-06-19 ENCOUNTER — HOSPITAL ENCOUNTER (OUTPATIENT)
Facility: MEDICAL CENTER | Age: 58
End: 2023-06-19
Attending: OTOLARYNGOLOGY | Admitting: OTOLARYNGOLOGY
Payer: COMMERCIAL

## 2023-06-19 ENCOUNTER — ANESTHESIA EVENT (OUTPATIENT)
Dept: SURGERY | Facility: MEDICAL CENTER | Age: 58
End: 2023-06-19
Payer: COMMERCIAL

## 2023-06-19 VITALS
TEMPERATURE: 97.2 F | BODY MASS INDEX: 26.67 KG/M2 | WEIGHT: 186.29 LBS | HEART RATE: 80 BPM | OXYGEN SATURATION: 94 % | SYSTOLIC BLOOD PRESSURE: 131 MMHG | HEIGHT: 70 IN | DIASTOLIC BLOOD PRESSURE: 90 MMHG | RESPIRATION RATE: 20 BRPM

## 2023-06-19 DIAGNOSIS — G89.18 ACUTE POST-OPERATIVE PAIN: ICD-10-CM

## 2023-06-19 PROCEDURE — A9270 NON-COVERED ITEM OR SERVICE: HCPCS | Performed by: ANESTHESIOLOGY

## 2023-06-19 PROCEDURE — 160035 HCHG PACU - 1ST 60 MINS PHASE I: Performed by: OTOLARYNGOLOGY

## 2023-06-19 PROCEDURE — 700111 HCHG RX REV CODE 636 W/ 250 OVERRIDE (IP): Performed by: ANESTHESIOLOGY

## 2023-06-19 PROCEDURE — 160009 HCHG ANES TIME/MIN: Performed by: OTOLARYNGOLOGY

## 2023-06-19 PROCEDURE — 00300 ANES ALL PX INTEG H/N/PTRUNK: CPT | Performed by: ANESTHESIOLOGY

## 2023-06-19 PROCEDURE — 71045 X-RAY EXAM CHEST 1 VIEW: CPT

## 2023-06-19 PROCEDURE — C1767 GENERATOR, NEURO NON-RECHARG: HCPCS | Performed by: OTOLARYNGOLOGY

## 2023-06-19 PROCEDURE — 700105 HCHG RX REV CODE 258: Performed by: ANESTHESIOLOGY

## 2023-06-19 PROCEDURE — A9270 NON-COVERED ITEM OR SERVICE: HCPCS | Performed by: OTOLARYNGOLOGY

## 2023-06-19 PROCEDURE — 160041 HCHG SURGERY MINUTES - EA ADDL 1 MIN LEVEL 4: Performed by: OTOLARYNGOLOGY

## 2023-06-19 PROCEDURE — 700102 HCHG RX REV CODE 250 W/ 637 OVERRIDE(OP): Performed by: OTOLARYNGOLOGY

## 2023-06-19 PROCEDURE — 160002 HCHG RECOVERY MINUTES (STAT): Performed by: OTOLARYNGOLOGY

## 2023-06-19 PROCEDURE — 160025 RECOVERY II MINUTES (STATS): Performed by: OTOLARYNGOLOGY

## 2023-06-19 PROCEDURE — 700101 HCHG RX REV CODE 250: Performed by: OTOLARYNGOLOGY

## 2023-06-19 PROCEDURE — 160047 HCHG PACU  - EA ADDL 30 MINS PHASE II: Performed by: OTOLARYNGOLOGY

## 2023-06-19 PROCEDURE — 700102 HCHG RX REV CODE 250 W/ 637 OVERRIDE(OP): Performed by: ANESTHESIOLOGY

## 2023-06-19 PROCEDURE — 700101 HCHG RX REV CODE 250: Performed by: ANESTHESIOLOGY

## 2023-06-19 PROCEDURE — 160048 HCHG OR STATISTICAL LEVEL 1-5: Performed by: OTOLARYNGOLOGY

## 2023-06-19 PROCEDURE — C1778 LEAD, NEUROSTIMULATOR: HCPCS | Performed by: OTOLARYNGOLOGY

## 2023-06-19 PROCEDURE — 160029 HCHG SURGERY MINUTES - 1ST 30 MINS LEVEL 4: Performed by: OTOLARYNGOLOGY

## 2023-06-19 PROCEDURE — 160046 HCHG PACU - 1ST 60 MINS PHASE II: Performed by: OTOLARYNGOLOGY

## 2023-06-19 DEVICE — IMPLANTABLE RESPIRATORY SENSING LEAD INSPIRE (1EA): Type: IMPLANTABLE DEVICE | Site: CHEST | Status: FUNCTIONAL

## 2023-06-19 DEVICE — IMPLANTABLE STIMULATION LEAD INSPIRE (1EA): Type: IMPLANTABLE DEVICE | Site: NECK | Status: FUNCTIONAL

## 2023-06-19 DEVICE — IMPLANTABLE PULSE GENERATOR INSPIRE (1EA): Type: IMPLANTABLE DEVICE | Site: CHEST | Status: FUNCTIONAL

## 2023-06-19 RX ORDER — SUCCINYLCHOLINE CHLORIDE 20 MG/ML
INJECTION INTRAMUSCULAR; INTRAVENOUS PRN
Status: DISCONTINUED | OUTPATIENT
Start: 2023-06-19 | End: 2023-06-19 | Stop reason: SURG

## 2023-06-19 RX ORDER — DIPHENHYDRAMINE HYDROCHLORIDE 50 MG/ML
12.5 INJECTION INTRAMUSCULAR; INTRAVENOUS
Status: DISCONTINUED | OUTPATIENT
Start: 2023-06-19 | End: 2023-06-19 | Stop reason: HOSPADM

## 2023-06-19 RX ORDER — ONDANSETRON 2 MG/ML
INJECTION INTRAMUSCULAR; INTRAVENOUS PRN
Status: DISCONTINUED | OUTPATIENT
Start: 2023-06-19 | End: 2023-06-19 | Stop reason: SURG

## 2023-06-19 RX ORDER — EPINEPHRINE 1 MG/ML(1)
AMPUL (ML) INJECTION
Status: DISCONTINUED
Start: 2023-06-19 | End: 2023-06-19 | Stop reason: HOSPADM

## 2023-06-19 RX ORDER — LIDOCAINE HYDROCHLORIDE AND EPINEPHRINE 10; 10 MG/ML; UG/ML
INJECTION, SOLUTION INFILTRATION; PERINEURAL
Status: DISCONTINUED | OUTPATIENT
Start: 2023-06-19 | End: 2023-06-19 | Stop reason: HOSPADM

## 2023-06-19 RX ORDER — BACITRACIN ZINC 500 [USP'U]/G
OINTMENT TOPICAL
Status: DISCONTINUED
Start: 2023-06-19 | End: 2023-06-19 | Stop reason: HOSPADM

## 2023-06-19 RX ORDER — ONDANSETRON 2 MG/ML
4 INJECTION INTRAMUSCULAR; INTRAVENOUS
Status: DISCONTINUED | OUTPATIENT
Start: 2023-06-19 | End: 2023-06-19 | Stop reason: HOSPADM

## 2023-06-19 RX ORDER — ROCURONIUM BROMIDE 10 MG/ML
INJECTION, SOLUTION INTRAVENOUS PRN
Status: DISCONTINUED | OUTPATIENT
Start: 2023-06-19 | End: 2023-06-19 | Stop reason: SURG

## 2023-06-19 RX ORDER — MIDAZOLAM HYDROCHLORIDE 1 MG/ML
1 INJECTION INTRAMUSCULAR; INTRAVENOUS
Status: DISCONTINUED | OUTPATIENT
Start: 2023-06-19 | End: 2023-06-19 | Stop reason: HOSPADM

## 2023-06-19 RX ORDER — LIDOCAINE HYDROCHLORIDE 10 MG/ML
INJECTION, SOLUTION EPIDURAL; INFILTRATION; INTRACAUDAL; PERINEURAL
Status: DISCONTINUED
Start: 2023-06-19 | End: 2023-06-19 | Stop reason: HOSPADM

## 2023-06-19 RX ORDER — SODIUM CHLORIDE, SODIUM LACTATE, POTASSIUM CHLORIDE, CALCIUM CHLORIDE 600; 310; 30; 20 MG/100ML; MG/100ML; MG/100ML; MG/100ML
INJECTION, SOLUTION INTRAVENOUS CONTINUOUS
Status: DISCONTINUED | OUTPATIENT
Start: 2023-06-19 | End: 2023-06-19 | Stop reason: HOSPADM

## 2023-06-19 RX ORDER — OXYCODONE HCL 5 MG/5 ML
5 SOLUTION, ORAL ORAL
Status: COMPLETED | OUTPATIENT
Start: 2023-06-19 | End: 2023-06-19

## 2023-06-19 RX ORDER — KETOROLAC TROMETHAMINE 30 MG/ML
INJECTION, SOLUTION INTRAMUSCULAR; INTRAVENOUS PRN
Status: DISCONTINUED | OUTPATIENT
Start: 2023-06-19 | End: 2023-06-19 | Stop reason: SURG

## 2023-06-19 RX ORDER — CEFAZOLIN SODIUM 1 G/3ML
INJECTION, POWDER, FOR SOLUTION INTRAMUSCULAR; INTRAVENOUS PRN
Status: DISCONTINUED | OUTPATIENT
Start: 2023-06-19 | End: 2023-06-19 | Stop reason: SURG

## 2023-06-19 RX ORDER — BACITRACIN ZINC 500 [USP'U]/G
OINTMENT TOPICAL
Status: DISCONTINUED | OUTPATIENT
Start: 2023-06-19 | End: 2023-06-19 | Stop reason: HOSPADM

## 2023-06-19 RX ORDER — MEPERIDINE HYDROCHLORIDE 25 MG/ML
12.5 INJECTION INTRAMUSCULAR; INTRAVENOUS; SUBCUTANEOUS
Status: DISCONTINUED | OUTPATIENT
Start: 2023-06-19 | End: 2023-06-19 | Stop reason: HOSPADM

## 2023-06-19 RX ORDER — HALOPERIDOL 5 MG/ML
1 INJECTION INTRAMUSCULAR
Status: DISCONTINUED | OUTPATIENT
Start: 2023-06-19 | End: 2023-06-19 | Stop reason: HOSPADM

## 2023-06-19 RX ORDER — DEXAMETHASONE SODIUM PHOSPHATE 4 MG/ML
INJECTION, SOLUTION INTRA-ARTICULAR; INTRALESIONAL; INTRAMUSCULAR; INTRAVENOUS; SOFT TISSUE PRN
Status: DISCONTINUED | OUTPATIENT
Start: 2023-06-19 | End: 2023-06-19 | Stop reason: SURG

## 2023-06-19 RX ORDER — SODIUM CHLORIDE, SODIUM LACTATE, POTASSIUM CHLORIDE, CALCIUM CHLORIDE 600; 310; 30; 20 MG/100ML; MG/100ML; MG/100ML; MG/100ML
INJECTION, SOLUTION INTRAVENOUS
Status: DISCONTINUED | OUTPATIENT
Start: 2023-06-19 | End: 2023-06-19 | Stop reason: SURG

## 2023-06-19 RX ORDER — OXYCODONE HCL 5 MG/5 ML
10 SOLUTION, ORAL ORAL
Status: COMPLETED | OUTPATIENT
Start: 2023-06-19 | End: 2023-06-19

## 2023-06-19 RX ORDER — LIDOCAINE HYDROCHLORIDE 20 MG/ML
INJECTION, SOLUTION EPIDURAL; INFILTRATION; INTRACAUDAL; PERINEURAL PRN
Status: DISCONTINUED | OUTPATIENT
Start: 2023-06-19 | End: 2023-06-19 | Stop reason: SURG

## 2023-06-19 RX ORDER — HYDROCODONE BITARTRATE AND ACETAMINOPHEN 7.5; 325 MG/1; MG/1
.5-1 TABLET ORAL EVERY 4 HOURS PRN
Qty: 20 TABLET | Refills: 0 | Status: SHIPPED | OUTPATIENT
Start: 2023-06-19 | End: 2023-06-24

## 2023-06-19 RX ADMIN — SUCCINYLCHOLINE CHLORIDE 100 MG: 20 INJECTION, SOLUTION INTRAMUSCULAR; INTRAVENOUS at 11:46

## 2023-06-19 RX ADMIN — ONDANSETRON 4 MG: 2 INJECTION INTRAMUSCULAR; INTRAVENOUS at 13:04

## 2023-06-19 RX ADMIN — DEXAMETHASONE SODIUM PHOSPHATE 8 MG: 4 INJECTION INTRA-ARTICULAR; INTRALESIONAL; INTRAMUSCULAR; INTRAVENOUS; SOFT TISSUE at 12:19

## 2023-06-19 RX ADMIN — LIDOCAINE HYDROCHLORIDE 60 MG: 20 INJECTION, SOLUTION EPIDURAL; INFILTRATION; INTRACAUDAL at 11:46

## 2023-06-19 RX ADMIN — CEFAZOLIN 2.5 G: 1 INJECTION, POWDER, FOR SOLUTION INTRAMUSCULAR; INTRAVENOUS at 11:59

## 2023-06-19 RX ADMIN — OXYCODONE HYDROCHLORIDE 10 MG: 5 SOLUTION ORAL at 14:55

## 2023-06-19 RX ADMIN — SODIUM CHLORIDE, POTASSIUM CHLORIDE, SODIUM LACTATE AND CALCIUM CHLORIDE: 600; 310; 30; 20 INJECTION, SOLUTION INTRAVENOUS at 11:41

## 2023-06-19 RX ADMIN — PROPOFOL 180 MG: 10 INJECTION, EMULSION INTRAVENOUS at 11:46

## 2023-06-19 RX ADMIN — KETOROLAC TROMETHAMINE 30 MG: 30 INJECTION, SOLUTION INTRAMUSCULAR; INTRAVENOUS at 13:04

## 2023-06-19 RX ADMIN — ROCURONIUM BROMIDE 5 MG: 50 INJECTION, SOLUTION INTRAVENOUS at 11:43

## 2023-06-19 RX ADMIN — FENTANYL CITRATE 100 MCG: 50 INJECTION, SOLUTION INTRAMUSCULAR; INTRAVENOUS at 11:43

## 2023-06-19 ASSESSMENT — PAIN DESCRIPTION - PAIN TYPE
TYPE: SURGICAL PAIN

## 2023-06-19 ASSESSMENT — PAIN SCALES - GENERAL: PAIN_LEVEL: 2

## 2023-06-19 ASSESSMENT — FIBROSIS 4 INDEX: FIB4 SCORE: 2.16

## 2023-06-19 NOTE — OR NURSING
1347 Patient arrived to PACU. Report received from anesthesia and OR RN. Patient on 6L of oxygen via mask. Placed on monitor. Patients surgical site incisions CDI . Oral airway in place     1349 Oral airway Dc'ed.     1420 Xray at bedside for imaging. Patient tolerating sips of water, declines any needs for pain or nausea.     1445 Xray results verified prior to discharged.     1455 Pain medication given. Discharge education provided to patient and spouse, and all questions answered. Educated on medications sent to pharmacy.     1526 Patient discharged with spouse. PIV removed. All belongings gathered and sent with patient.

## 2023-06-19 NOTE — ANESTHESIA PREPROCEDURE EVALUATION
Case: [id number] Date/Time: 06/19/23 1130    Procedure: INSERTION OF HYPOGLOSSAL NERVE NEUROSTIMULATOR ELECTRODE AND GENERATOR AND BREATHING SENSOR ELECTRODE-RIGHT.    Pre-op diagnosis:       OBSTRUCTIVE SLEEP APNEA      BODY MASS INDEX 26    Location: [facility] ROOM 28 / SURGERY SAME DAY [facility]    Surgeons: Kamar Cormier M.D.          Relevant Problems   CARDIAC   (positive) Essential hypertension, benign   (positive) Left anterior fascicular block       Physical Exam    Airway   Mallampati: II  TM distance: >3 FB  Neck ROM: full       Cardiovascular - normal exam  Rhythm: regular  Rate: normal  (-) murmur     Dental - normal exam           Pulmonary - normal exam  Breath sounds clear to auscultation     Abdominal    Neurological - normal exam                 Anesthesia Plan    ASA 2       Plan - general       Airway plan will be ETT          Induction: intravenous    Postoperative Plan: Postoperative administration of opioids is intended.    Pertinent diagnostic labs and testing reviewed    Informed Consent:    Anesthetic plan and risks discussed with patient.    Use of blood products discussed with: patient whom consented to blood products.

## 2023-06-19 NOTE — DISCHARGE INSTRUCTIONS
Keep incisions dry for 48 hours, then may get wet but do not soak or submerge.   Clean incisions twice a day with gentle soap and keep covered with ointment such as bacitracin or Neosporin or Vaseline    If any questions arise, call your provider.  If your provider is not available, please feel free to call the Surgical Center at [phone].    MEDICATIONS: Resume taking daily medication.  Take prescribed pain medication with food.  If no medication is prescribed, you may take non-aspirin pain medication if needed.  PAIN MEDICATION CAN BE VERY CONSTIPATING.  Take a stool softener or laxative such as senokot, pericolace, or milk of magnesia if needed.    Last pain medication given: Toradol (similar to Ibuprofen) at 1:05pm    What to Expect Post Anesthesia    Rest and take it easy for the first 24 hours.  A responsible adult is recommended to remain with you during that time.  It is normal to feel sleepy.  We encourage you to not do anything that requires balance, judgment or coordination.    FOR 24 HOURS DO NOT:  Drive, operate machinery or run household appliances.  Drink beer or alcoholic beverages.  Make important decisions or sign legal documents.    To avoid nausea, slowly advance diet as tolerated, avoiding spicy or greasy foods for the first day.  Add more substantial food to your diet according to your provider's instructions.  Babies can be fed formula or breast milk as soon as they are hungry.  INCREASE FLUIDS AND FIBER TO AVOID CONSTIPATION.    MILD FLU-LIKE SYMPTOMS ARE NORMAL.  YOU MAY EXPERIENCE GENERALIZED MUSCLE ACHES, THROAT IRRITATION, HEADACHE AND/OR SOME NAUSEA.

## 2023-06-19 NOTE — OP REPORT
DATE OF OPERATION: 6/19/23     PREOPERATIVE DIAGNOSIS:Obstructive Sleep Apnea    POSTOPERATIVE DIAGNOSIS: Same    PROCEDURE PERFORMED: Right cranial nerve stimulation implant ([employer])    SURGEON: Kamar Cormier M.D.    ANESTHESIA: General endotracheal anesthesia    INDICATIONS: The patient is a 57 y.o.-year-old male with obstructive sleep apnea refractory to CPAP. They have passed their DISE evaluation.     FINDINGS: Implant stimulated well at the end of the case    SPECIMEN: None    ESTIMATED BLOOD LOSS: 30 mL.    PROCEDURE: Following informed consent, the patient was properly identified, taken to the operating room and placed in supine position where general endotracheal anesthesia was administered. Intravenous antibiotics were administered by the anesthesiologist in correct time interval. Sequential compression devices were employed. The neck and chest were draped and prepared in the normal surgical fashion for this procedure with betadine and ioban dressing.     After two leads were placed in the right tongue and the right floor of mouth, patient was then   marked at the appropriate location for the 2 incisions in the right sub-mentum   and the right chest.      First, the submental area was addressed.  An incision   was made through the skin and platysma.  The anterior border of the   submandibular gland was developed and carried to the mylohyoid.  The digastric   nerve was retracted inferiorly with two 3-0 silks and secured in place with   clamps.  The mylohyoid was retracted anteriorly and the submandibular gland   was retracted posteriorly and the hypoglossal nerve was visualized.  The   fascia overlying it was carefully removed.  C1 was visualized inferiorly and   the vasa vasorum breakpoint was visualized.  Dissection was carried through   this area and a small pocket was created.  It was then stimulated and found to   be with inclusion branches while superior to this were found to be exclusion    branches.  The cuff was then placed around the nerve in this location and   secured with the previously placed 3-0 silks.  Saline was introduced   underneath of the cuff and then to the right neck incision, which was then   packed with a wet 4 x 4.      Attention was then turned to the chest where an   incision was made through the skin just inferior to the second clavicle.    Dissection was carried down to the anterior border of the pectoralis major and   a small pocket was created in this area inferiorly for the processor.  Blunt   dissection was carried through the pectoralis major to the anterior border of   the chest where the external and internal intercostal muscles were visualized.    Small pocket was then created through the external intercostal muscles and   the sensory probe was then placed in the pocket and secured anteriorly with   four 3-0 silks at the anchor site.  It was then further secured at the second   anchor site with four 3-0 silks.  The previously placed lead in the right neck   was then tunneled under the skin through the inferior flap into the chest   incision where it was then hooked up to the battery/processor along with the   sensory lead was then coiled underneath of the processor, which was then placed in   the chest pocket. Testing indicated an appropriate response. The processor was then   further secured with two 2-0 silks. Then the wound was closed with   interrupted 2-0 Vicryl for deep, 3-0 Vicryl for the skin and then a running   2-0 Prolene for the skin.  Attention was then turned back up to the neck where   it too was closed using a 2-0 Vicryl for the platysma, 3-0 Vicryl for the   deep dermal and then a running 5-0 subcuticular Monocryl for the skin with   overlying bacitracin ointment placed on both incisions.  At this point, my   portion of the procedure was terminated and the patient was turned back over   to anesthesia for emergence.    The patient tolerated the  procedure well and there were no apparent complication. All sponge, needle, and instrument counts were correct on 2 separate occasions. He  was awakened, extubated, and transferred to the recovery room in satisfactory condition.           ____________________________________   Kamar Cormier M.D.    DD: 12/6/2021  2:28 PM

## 2023-06-19 NOTE — ANESTHESIA TIME REPORT
Anesthesia Start and Stop Event Times     Date Time Event    6/19/2023 1045 Ready for Procedure     1141 Anesthesia Start     1350 Anesthesia Stop        Responsible Staff  06/19/23    Name Role Begin End    Luke Pate M.D. Anesth 1141 1350        Overtime Reason:  no overtime (within assigned shift)    Comments:

## 2023-06-19 NOTE — ANESTHESIA PROCEDURE NOTES
Airway    Date/Time: 6/19/2023 11:47 AM    Performed by: Luke Pate M.D.  Authorized by: Luke Pate M.D.    Location:  OR  Urgency:  Elective  Difficult Airway: No    Indications for Airway Management:  Anesthesia      Spontaneous Ventilation: absent    Sedation Level:  Deep  Preoxygenated: Yes    Patient Position:  Sniffing  Final Airway Type:  Endotracheal airway  Final Endotracheal Airway:  ETT  Cuffed: Yes    Technique Used for Successful ETT Placement:  Direct laryngoscopy    Insertion Site:  Oral  Blade Type:  Brittany  Laryngoscope Blade/Videolaryngoscope Blade Size:  3  ETT Size (mm):  8.0  Measured from:  Lips  ETT to Lips (cm):  23  Placement Verified by: auscultation and capnometry    Cormack-Lehane Classification:  Grade I - full view of glottis  Number of Attempts at Approach:  1  Number of Other Approaches Attempted:  0

## 2023-06-19 NOTE — ANESTHESIA POSTPROCEDURE EVALUATION
Patient: Isma Garcia    Procedure Summary     Date: 06/19/23 Room / Location: [facility] ROOM 28 / [facility]    Anesthesia Start: 1141 Anesthesia Stop: 1350    Procedure: INSERTION OF HYPOGLOSSAL NERVE NEUROSTIMULATOR ELECTRODE AND GENERATOR AND BREATHING SENSOR ELECTRODE-RIGHT. (Right: Neck) Diagnosis: (OBSTRUCTIVE SLEEP APNEA, BODY MASS INDEX 26)    Surgeons: Kamar Cormier M.D. Responsible Provider: Luke Pate M.D.    Anesthesia Type: general ASA Status: 2          Final Anesthesia Type: general  Last vitals  BP   Blood Pressure: 128/85    Temp   36.2 °C (97.2 °F)    Pulse   83   Resp   (!) 11    SpO2   96 %      Anesthesia Post Evaluation    Patient location during evaluation: PACU  Patient participation: complete - patient participated  Level of consciousness: awake and alert  Pain score: 2    Airway patency: patent  Anesthetic complications: no  Cardiovascular status: hemodynamically stable  Respiratory status: acceptable  Hydration status: euvolemic    PONV: none          No notable events documented.     Nurse Pain Score: 0 (NPRS)

## 2023-06-24 LAB
ALBUMIN SERPL-MCNC: 4.5 G/DL (ref 3.8–4.9)
ALBUMIN/GLOB SERPL: 1.9 {RATIO} (ref 1.2–2.2)
ALP SERPL-CCNC: 70 IU/L (ref 44–121)
ALT SERPL-CCNC: 36 IU/L (ref 0–44)
AST SERPL-CCNC: 35 IU/L (ref 0–40)
BILIRUB SERPL-MCNC: 0.9 MG/DL (ref 0–1.2)
BUN SERPL-MCNC: 19 MG/DL (ref 6–24)
BUN/CREAT SERPL: 18 (ref 9–20)
CALCIUM SERPL-MCNC: 9.9 MG/DL (ref 8.7–10.2)
CHLORIDE SERPL-SCNC: 102 MMOL/L (ref 96–106)
CO2 SERPL-SCNC: 23 MMOL/L (ref 20–29)
CREAT SERPL-MCNC: 1.07 MG/DL (ref 0.76–1.27)
EGFRCR SERPLBLD CKD-EPI 2021: 80 ML/MIN/1.73
GLOBULIN SER CALC-MCNC: 2.4 G/DL (ref 1.5–4.5)
GLUCOSE SERPL-MCNC: 99 MG/DL (ref 70–99)
POTASSIUM SERPL-SCNC: 5 MMOL/L (ref 3.5–5.2)
PROT SERPL-MCNC: 6.9 G/DL (ref 6–8.5)
SODIUM SERPL-SCNC: 139 MMOL/L (ref 134–144)

## 2023-10-31 DIAGNOSIS — M1A.0710 CHRONIC IDIOPATHIC GOUT INVOLVING TOE OF RIGHT FOOT WITHOUT TOPHUS: ICD-10-CM

## 2023-10-31 RX ORDER — ALLOPURINOL 300 MG/1
300 TABLET ORAL DAILY
Qty: 90 TABLET | Refills: 0 | Status: SHIPPED | OUTPATIENT
Start: 2023-10-31 | End: 2024-01-05 | Stop reason: SDUPTHER

## 2023-11-20 ENCOUNTER — PATIENT MESSAGE (OUTPATIENT)
Dept: MEDICAL GROUP | Facility: MEDICAL CENTER | Age: 58
End: 2023-11-20
Payer: COMMERCIAL

## 2023-11-20 DIAGNOSIS — I10 ESSENTIAL HYPERTENSION, BENIGN: ICD-10-CM

## 2023-11-21 RX ORDER — LISINOPRIL 20 MG/1
20 TABLET ORAL DAILY
Qty: 30 TABLET | Refills: 0 | Status: SHIPPED | OUTPATIENT
Start: 2023-11-21 | End: 2023-12-27

## 2023-11-29 DIAGNOSIS — I10 ESSENTIAL HYPERTENSION, BENIGN: ICD-10-CM

## 2023-11-29 RX ORDER — LISINOPRIL 20 MG/1
20 TABLET ORAL DAILY
Qty: 30 TABLET | Refills: 0 | OUTPATIENT
Start: 2023-11-29

## 2023-12-26 DIAGNOSIS — I10 ESSENTIAL HYPERTENSION, BENIGN: ICD-10-CM

## 2023-12-27 RX ORDER — LISINOPRIL 20 MG/1
20 TABLET ORAL
Qty: 30 TABLET | Refills: 0 | Status: SHIPPED | OUTPATIENT
Start: 2023-12-27 | End: 2024-01-02 | Stop reason: SDUPTHER

## 2023-12-27 NOTE — TELEPHONE ENCOUNTER
Received request via: Pharmacy    Was the patient seen in the last year in this department? Yes    Does the patient have an active prescription (recently filled or refills available) for medication(s) requested? YES    Does the patient have [facility] and need 100 day supply (blood pressure, diabetes and cholesterol meds only)? Patient does not have SCP  Requested Prescriptions     Pending Prescriptions Disp Refills    lisinopril (PRINIVIL) 20 MG Tab [Pharmacy Med Name: LISINOPRIL 20MG TABLETS] 90 Tablet      Sig: TAKE 1 TABLET BY MOUTH EVERY DAY

## 2024-01-02 DIAGNOSIS — I10 ESSENTIAL HYPERTENSION, BENIGN: ICD-10-CM

## 2024-01-02 RX ORDER — LISINOPRIL 20 MG/1
20 TABLET ORAL
Qty: 30 TABLET | Refills: 0 | Status: SHIPPED | OUTPATIENT
Start: 2024-01-02 | End: 2024-01-05

## 2024-01-02 NOTE — TELEPHONE ENCOUNTER
Please send a letter to patient that this is the last prescription refilling without any appointment.

## 2024-01-05 ENCOUNTER — OFFICE VISIT (OUTPATIENT)
Dept: MEDICAL GROUP | Facility: MEDICAL CENTER | Age: 59
End: 2024-01-05
Payer: COMMERCIAL

## 2024-01-05 VITALS
OXYGEN SATURATION: 96 % | HEIGHT: 70 IN | HEART RATE: 89 BPM | TEMPERATURE: 98 F | DIASTOLIC BLOOD PRESSURE: 62 MMHG | BODY MASS INDEX: 26.2 KG/M2 | SYSTOLIC BLOOD PRESSURE: 106 MMHG | WEIGHT: 183 LBS

## 2024-01-05 DIAGNOSIS — Z11.59 NEED FOR HEPATITIS C SCREENING TEST: ICD-10-CM

## 2024-01-05 DIAGNOSIS — Z12.5 PROSTATE CANCER SCREENING: ICD-10-CM

## 2024-01-05 DIAGNOSIS — E83.110 HEREDITARY HEMOCHROMATOSIS (HCC): ICD-10-CM

## 2024-01-05 DIAGNOSIS — N52.9 ERECTILE DYSFUNCTION, UNSPECIFIED ERECTILE DYSFUNCTION TYPE: ICD-10-CM

## 2024-01-05 DIAGNOSIS — I10 ESSENTIAL HYPERTENSION, BENIGN: ICD-10-CM

## 2024-01-05 DIAGNOSIS — Z00.00 WELL ADULT EXAM: ICD-10-CM

## 2024-01-05 DIAGNOSIS — M1A.0710 CHRONIC IDIOPATHIC GOUT INVOLVING TOE OF RIGHT FOOT WITHOUT TOPHUS: ICD-10-CM

## 2024-01-05 DIAGNOSIS — E29.1 HYPOGONADISM MALE: ICD-10-CM

## 2024-01-05 PROCEDURE — 99214 OFFICE O/P EST MOD 30 MIN: CPT | Performed by: INTERNAL MEDICINE

## 2024-01-05 PROCEDURE — 3074F SYST BP LT 130 MM HG: CPT | Performed by: INTERNAL MEDICINE

## 2024-01-05 PROCEDURE — 3078F DIAST BP <80 MM HG: CPT | Performed by: INTERNAL MEDICINE

## 2024-01-05 RX ORDER — LIDOCAINE 50 MG/G
OINTMENT TOPICAL
COMMUNITY
End: 2024-01-05

## 2024-01-05 RX ORDER — TRIAMCINOLONE ACETONIDE 1 MG/G
CREAM TOPICAL
COMMUNITY

## 2024-01-05 RX ORDER — CYCLOBENZAPRINE HCL 10 MG
1 TABLET ORAL 3 TIMES DAILY PRN
COMMUNITY

## 2024-01-05 RX ORDER — LISINOPRIL 10 MG/1
10 TABLET ORAL DAILY
Qty: 90 TABLET | Refills: 3 | Status: SHIPPED | OUTPATIENT
Start: 2024-01-05 | End: 2024-01-08 | Stop reason: SDUPTHER

## 2024-01-05 RX ORDER — COLCHICINE 0.6 MG/1
1 TABLET ORAL
COMMUNITY
End: 2024-01-05

## 2024-01-05 RX ORDER — HYDROCODONE BITARTRATE AND ACETAMINOPHEN 7.5; 325 MG/1; MG/1
TABLET ORAL
COMMUNITY

## 2024-01-05 RX ORDER — TADALAFIL 5 MG/1
5 TABLET ORAL DAILY
Qty: 90 TABLET | Refills: 3 | Status: SHIPPED | OUTPATIENT
Start: 2024-01-05

## 2024-01-05 RX ORDER — ALLOPURINOL 300 MG/1
300 TABLET ORAL DAILY
Qty: 90 TABLET | Refills: 3 | Status: SHIPPED | OUTPATIENT
Start: 2024-01-05

## 2024-01-05 RX ORDER — ACETAMINOPHEN AND CODEINE PHOSPHATE 300; 30 MG/1; MG/1
1 TABLET ORAL 3 TIMES DAILY PRN
COMMUNITY
End: 2024-01-05

## 2024-01-05 RX ORDER — ANASTROZOLE 1 MG/1
1 TABLET ORAL DAILY
COMMUNITY

## 2024-01-05 RX ORDER — PREDNISONE 20 MG/1
TABLET ORAL
COMMUNITY
End: 2024-01-05

## 2024-01-05 RX ORDER — LISINOPRIL 20 MG/1
1 TABLET ORAL
COMMUNITY
End: 2024-01-05

## 2024-01-05 RX ORDER — COLCHICINE 0.6 MG/1
0.6 TABLET ORAL DAILY
Qty: 90 TABLET | Refills: 3 | Status: SHIPPED | OUTPATIENT
Start: 2024-01-05

## 2024-01-05 ASSESSMENT — FIBROSIS 4 INDEX: FIB4 SCORE: 2.04

## 2024-01-05 ASSESSMENT — PATIENT HEALTH QUESTIONNAIRE - PHQ9: CLINICAL INTERPRETATION OF PHQ2 SCORE: 0

## 2024-01-06 NOTE — PROGRESS NOTES
Established Patient    Isma presents today with the following:    CC: Follow-up for chronic medical problems    HPI:   Isma is a 58 y.o. male who came in for above.    He lost 14 pounds in the past 16 months by doing healthy lifestyle, healthier diet, normal exercises.    Blood pressure is getting lower.  He does not have any symptoms.  He does have a blood pressure cuff at home but not sure if it is working properly.    He had 2-3 gout flares in the past 6 months, none in the past 2 months.  Due to dietary triggers, red meat.        ROS:   As above    Patient Active Problem List    Diagnosis Date Noted    Hypogonadism, male 09/10/2021    Primary osteoarthritis of both shoulders 08/18/2021    Shift work sleep disorder 08/18/2021    Genital herpes 08/18/2021    Erectile dysfunction 01/10/2013    Left anterior fascicular block 01/10/2013    Essential hypertension, benign 01/10/2013    Hereditary hemochromatosis (HCC) 09/19/2012    Gout     Chronic low back pain with bilateral sciatica     History of basal cell carcinoma (BCC)        Current Outpatient Medications   Medication Sig Dispense Refill    lisinopril (PRINIVIL) 10 MG Tab Take 1 Tablet by mouth every day. 90 Tablet 3    colchicine (COLCRYS) 0.6 MG Tab Take 1 Tablet by mouth every day. 90 Tablet 3    allopurinol (ZYLOPRIM) 300 MG Tab Take 1 Tablet by mouth every day. 90 Tablet 3    tadalafil (CIALIS) 5 MG tablet Take 1 Tablet by mouth every day. 90 Tablet 3    anastrozole (ARIMIDEX) 1 MG Tab Take 1 mg by mouth every day.      cyclobenzaprine (FLEXERIL) 10 mg Tab Take 1 Tablet by mouth 3 times a day as needed.      HYDROcodone-acetaminophen (NORCO) 7.5-325 MG tab       triamcinolone acetonide (KENALOG) 0.1 % Cream APPLY AFFECTED AREA TWICE DAILY FOR ITCHING. NOT FOR FACE.      TESTOSTERONE IM Inject 0.7 mL into the shoulder, thigh, or buttocks every 7 days.      multivitamin Tab Take 1 Tablet by mouth every day.      Glucosamine HCl (GLUCOSAMINE PO)  "Take 1 Tablet by mouth every day.      Omega-3 Fatty Acids (FISH OIL PO) Take 1 Tablet by mouth every day.      valACYclovir (VALTREX) 500 MG Tab TAKE 1 TABLET BY MOUTH TWICE DAILY FOR 3 TO 5 DAYS FOR EACH HERPES FLARE UP 90 Tablet 3    CLOMID 50 MG tablet TAKE 1 TABLET BY MOUTH TWO TIMES PER WEEK      indomethacin (INDOCIN) 50 MG Cap Take 1 Capsule by mouth 3 times a day as needed (gout pain). Take with food. 60 Capsule 1     No current facility-administered medications for this visit.         /62 (BP Location: Left arm, Patient Position: Sitting, BP Cuff Size: Adult)   Pulse 89   Temp 36.7 °C (98 °F) (Temporal)   Ht 1.778 m (5' 10\")   Wt 83 kg (183 lb)   SpO2 96%   BMI 26.26 kg/m²     Physical Exam  General: Alert and oriented, No apparent distress.  Neck: Supple. No cervical or supraclavicular lymphadenopathy noted. Thyroid not enlarged.  Lungs: Clear to auscultation bilaterally without any wheezing, crepitations.  Cardiovascular: Regular rate and rhythm. No murmurs, rubs or gallops.  Abdomen: Bowel sound +, soft, non tender, no rebound or guarding, no palpable organomegaly  Extremities: No edema.      Assessment and Plan    1. Essential hypertension, benign  Low normal today.  This is expected with weight loss.  Reduce lisinopril to 10 mg daily.  Monitor home blood pressure with a goal of less than 130/90.  Recommendation for blood pressure cuffs given.  - lisinopril (PRINIVIL) 10 MG Tab; Take 1 Tablet by mouth every day.  Dispense: 90 Tablet; Refill: 3    2. Chronic idiopathic gout involving toe of right foot without tophus  Uric acid was not at target before. But with healthier lifestyle and weight loss, he may not need increased dose of allopurinol as long as he can adhere to healthy lifestyle.  Recheck uric acid level. If he is comfortable without flares in the next several months, he can try stopping colchicine.  I will make a final recommendation about colchicine after his uric acid level.  - " URIC ACID; Future  - colchicine (COLCRYS) 0.6 MG Tab; Take 1 Tablet by mouth every day.  Dispense: 90 Tablet; Refill: 3  - allopurinol (ZYLOPRIM) 300 MG Tab; Take 1 Tablet by mouth every day.  Dispense: 90 Tablet; Refill: 3    3. Hereditary hemochromatosis (HCC)  - FERRITIN; Future  - IRON/TOTAL IRON BIND; Future    4. Erectile dysfunction, unspecified erectile dysfunction type  Currently on Cialis 5 mg daily.  He would like to refill it here.  - tadalafil (CIALIS) 5 MG tablet; Take 1 Tablet by mouth every day.  Dispense: 90 Tablet; Refill: 3    5. Hypogonadism male  On testosterone injection prescribed by Dr. Lau.  He had 2 occasions of rib fracture with minimal trauma (one time was tight hugging, other time was pulling his upper body to his feet)  - VITAMIN D,25 HYDROXY (DEFICIENCY); Future  - DS-BONE DENSITY STUDY (DEXA); Future    6. Well adult exam  - CBC WITH DIFFERENTIAL; Future  - Comp Metabolic Panel; Future  - Lipid Profile; Future    7. Need for hepatitis C screening test  - HEP C VIRUS ANTIBODY; Future    8. Prostate cancer screening  - PROSTATE SPECIFIC AG SCREENING; Future          Return in about 1 year (around 1/5/2025) for Annual wellness visit.      Signed by: Maria Elena Latif M.D.

## 2024-01-08 DIAGNOSIS — I10 ESSENTIAL HYPERTENSION, BENIGN: ICD-10-CM

## 2024-01-09 RX ORDER — LISINOPRIL 10 MG/1
10 TABLET ORAL DAILY
Qty: 90 TABLET | Refills: 3 | Status: SHIPPED | OUTPATIENT
Start: 2024-01-09 | End: 2024-03-27 | Stop reason: SDUPTHER

## 2024-01-15 ENCOUNTER — HOSPITAL ENCOUNTER (OUTPATIENT)
Dept: RADIOLOGY | Facility: MEDICAL CENTER | Age: 59
End: 2024-01-15
Attending: INTERNAL MEDICINE
Payer: COMMERCIAL

## 2024-01-15 DIAGNOSIS — E29.1 HYPOGONADISM MALE: ICD-10-CM

## 2024-01-15 PROCEDURE — 77080 DXA BONE DENSITY AXIAL: CPT

## 2024-01-22 ENCOUNTER — APPOINTMENT (OUTPATIENT)
Dept: RADIOLOGY | Facility: MEDICAL CENTER | Age: 59
End: 2024-01-22
Attending: INTERNAL MEDICINE
Payer: COMMERCIAL

## 2024-03-12 ENCOUNTER — OFFICE VISIT (OUTPATIENT)
Dept: URGENT CARE | Facility: CLINIC | Age: 59
End: 2024-03-12
Payer: COMMERCIAL

## 2024-03-12 VITALS
SYSTOLIC BLOOD PRESSURE: 116 MMHG | BODY MASS INDEX: 26.48 KG/M2 | DIASTOLIC BLOOD PRESSURE: 84 MMHG | TEMPERATURE: 98.5 F | OXYGEN SATURATION: 96 % | HEART RATE: 102 BPM | RESPIRATION RATE: 20 BRPM | HEIGHT: 70 IN | WEIGHT: 185 LBS

## 2024-03-12 DIAGNOSIS — L03.011 PARONYCHIA OF FINGER OF RIGHT HAND: ICD-10-CM

## 2024-03-12 PROCEDURE — 3074F SYST BP LT 130 MM HG: CPT | Performed by: NURSE PRACTITIONER

## 2024-03-12 PROCEDURE — 10060 I&D ABSCESS SIMPLE/SINGLE: CPT | Performed by: NURSE PRACTITIONER

## 2024-03-12 PROCEDURE — 3079F DIAST BP 80-89 MM HG: CPT | Performed by: NURSE PRACTITIONER

## 2024-03-12 RX ORDER — CEPHALEXIN 500 MG/1
500 CAPSULE ORAL 2 TIMES DAILY
Qty: 10 CAPSULE | Refills: 0 | Status: SHIPPED | OUTPATIENT
Start: 2024-03-12 | End: 2024-03-17

## 2024-03-12 ASSESSMENT — FIBROSIS 4 INDEX: FIB4 SCORE: 2.04

## 2024-03-12 NOTE — PROGRESS NOTES
Date: 03/12/24        Chief Complaint   Patient presents with    Finger Pain     X1 week, right hand 3rd finger swollen and painful, concerns about possible infection         History of Present Illness: 58 y.o.  male presents to clinic with 1 week history of right middle finger pain and redness and swelling.  Patient denies any fever body aches.  He denies any trauma.  He does not bite his nails. UTD on TD       ROS:    As otherwise stated in HPI    Medical/SX/ Social History:  Reviewed per chart    Pertinent Medications:    Current Outpatient Medications on File Prior to Visit   Medication Sig Dispense Refill    lisinopril (PRINIVIL) 10 MG Tab Take 1 Tablet by mouth every day. 90 Tablet 3    cyclobenzaprine (FLEXERIL) 10 mg Tab Take 1 Tablet by mouth 3 times a day as needed.      triamcinolone acetonide (KENALOG) 0.1 % Cream APPLY AFFECTED AREA TWICE DAILY FOR ITCHING. NOT FOR FACE.      colchicine (COLCRYS) 0.6 MG Tab Take 1 Tablet by mouth every day. 90 Tablet 3    allopurinol (ZYLOPRIM) 300 MG Tab Take 1 Tablet by mouth every day. 90 Tablet 3    tadalafil (CIALIS) 5 MG tablet Take 1 Tablet by mouth every day. 90 Tablet 3    anastrozole (ARIMIDEX) 1 MG Tab Take 1 mg by mouth every day.      TESTOSTERONE IM Inject 0.7 mL into the shoulder, thigh, or buttocks every 7 days.      multivitamin Tab Take 1 Tablet by mouth every day.      Glucosamine HCl (GLUCOSAMINE PO) Take 1 Tablet by mouth every day.      Omega-3 Fatty Acids (FISH OIL PO) Take 1 Tablet by mouth every day.      valACYclovir (VALTREX) 500 MG Tab TAKE 1 TABLET BY MOUTH TWICE DAILY FOR 3 TO 5 DAYS FOR EACH HERPES FLARE UP 90 Tablet 3    CLOMID 50 MG tablet TAKE 1 TABLET BY MOUTH TWO TIMES PER WEEK      indomethacin (INDOCIN) 50 MG Cap Take 1 Capsule by mouth 3 times a day as needed (gout pain). Take with food. 60 Capsule 1    HYDROcodone-acetaminophen (NORCO) 7.5-325 MG tab        No current facility-administered medications on file prior to visit.         Allergies:    Patient has no known allergies.     Problem list, medications, and allergies reviewed by myself today in Epic     Physical Exam:    Vitals:    03/12/24 0909   BP: 116/84   Pulse: (!) 102   Resp: 20   Temp: 36.9 °C (98.5 °F)   SpO2: 96%             Physical Exam  Constitutional:       Appearance: Normal appearance. He is not ill-appearing.   HENT:      Head: Normocephalic and atraumatic.   Musculoskeletal:      Comments: Paronychia noted to the finger.  Area fluctuance note with surrounding erythema.  There is no proximal erythematous streaking.  Warm and tender to palpation.   Neurological:      Mental Status: He is alert.          Procedure Note Abscess I&D:    Risks versus benefits and procedure explained. Verbal informed consent obtained. The fluctuant center was incised using an #11 blade scalpel. A moderate amount of purulent fluid was drained and expressed with moderate pressure. The wound was left open to allow for continued drainage. Dry sterile dressing was applied. Patient tolerated procedure well with no complications.           Medical Decision making and plan :  I personally reviewed prior external notes and test results pertinent to today's visit. Pt is clinically stable at today's acute urgent care visit.  Patient appears nontoxic with no acute distress noted. Appropriate for outpatient care at this time.      Pleasant 58 y.o. male presented clinic with paronychia right middle finger.  Drained as above.  Patient did tolerate well.  Discussed appropriate wound care as well as warm Epsom salt soaks.    1. Paronychia of finger of right hand    - cephALEXin (KEFLEX) 500 MG Cap; Take 1 Capsule by mouth 2 times a day for 5 days.  Dispense: 10 Capsule; Refill: 0       Shared decision-making was utilized with patient for treatment plan. Medication discussed included indication for use and the potential benefits and side effects. Education was provided regarding the aforementioned  assessments.  Differential Diagnosis, natural history, and supportive care discussed. All of the patient's questions were answered to their satisfaction at the time of discharge. Patient was encouraged to monitor symptoms closely. Those signs and symptoms which would warrant concern and mandate seeking a higher level of service through the emergency department discussed at length.  Patient stated agreement and understanding of this plan of care.    Disposition:  Home in stable condition       Voice Recognition Disclaimer:  Portions of this document were created using voice recognition software. The software does have a chance of producing errors of grammar and possibly content. I have made every reasonable attempt to correct obvious errors, but there may be errors of grammar and possibly content that I did not discover before finalizing the documentation.    SHABNAM Gage.

## 2024-03-27 DIAGNOSIS — I10 ESSENTIAL HYPERTENSION, BENIGN: ICD-10-CM

## 2024-03-27 RX ORDER — LISINOPRIL 10 MG/1
10 TABLET ORAL DAILY
Qty: 90 TABLET | Refills: 3 | Status: SHIPPED | OUTPATIENT
Start: 2024-03-27

## 2024-03-27 NOTE — TELEPHONE ENCOUNTER
Received request via: Pharmacy    Was the patient seen in the last year in this department? Yes    Does the patient have an active prescription (recently filled or refills available) for medication(s) requested? No    [facility]: [facility]    Does the patient have [facility] and need 100 day supply (blood pressure, diabetes and cholesterol meds only)? Patient does not have SCP

## 2024-03-30 LAB
ALBUMIN SERPL-MCNC: 4.3 G/DL (ref 3.8–4.9)
ALBUMIN/GLOB SERPL: 1.8 {RATIO} (ref 1.2–2.2)
ALP SERPL-CCNC: 74 IU/L (ref 44–121)
ALT SERPL-CCNC: 29 IU/L (ref 0–44)
AST SERPL-CCNC: 28 IU/L (ref 0–40)
BILIRUB SERPL-MCNC: 0.6 MG/DL (ref 0–1.2)
BUN SERPL-MCNC: 17 MG/DL (ref 6–24)
BUN/CREAT SERPL: 17 (ref 9–20)
CALCIUM SERPL-MCNC: 9.3 MG/DL (ref 8.7–10.2)
CHLORIDE SERPL-SCNC: 103 MMOL/L (ref 96–106)
CO2 SERPL-SCNC: 21 MMOL/L (ref 20–29)
CREAT SERPL-MCNC: 1.01 MG/DL (ref 0.76–1.27)
EGFRCR SERPLBLD CKD-EPI 2021: 86 ML/MIN/1.73
GLOBULIN SER CALC-MCNC: 2.4 G/DL (ref 1.5–4.5)
GLUCOSE SERPL-MCNC: 94 MG/DL (ref 70–99)
POTASSIUM SERPL-SCNC: 4.6 MMOL/L (ref 3.5–5.2)
PROT SERPL-MCNC: 6.7 G/DL (ref 6–8.5)
SODIUM SERPL-SCNC: 137 MMOL/L (ref 134–144)

## 2024-05-25 NOTE — TELEPHONE ENCOUNTER
Due for annual wellness visit.  Please contact patient to schedule.  
Received request via: Pharmacy    Was the patient seen in the last year in this department? No    Does the patient have an active prescription (recently filled or refills available) for medication(s) requested? yes    Does the patient have [facility] and need 100 day supply (blood pressure, diabetes and cholesterol meds only)? Patient does not have SCP   Requested Prescriptions     Pending Prescriptions Disp Refills    allopurinol (ZYLOPRIM) 300 MG Tab [Pharmacy Med Name: ALLOPURINOL 300MG TABLETS] 90 Tablet 0     Sig: TAKE 1 TABLET BY MOUTH EVERY DAY      
Try to contact pt no voicemail  
13-Apr-2024 12:21

## 2025-03-09 DIAGNOSIS — M1A.0710 CHRONIC IDIOPATHIC GOUT INVOLVING TOE OF RIGHT FOOT WITHOUT TOPHUS: ICD-10-CM

## 2025-03-10 RX ORDER — ALLOPURINOL 300 MG/1
300 TABLET ORAL DAILY
Qty: 90 TABLET | Refills: 0 | Status: SHIPPED | OUTPATIENT
Start: 2025-03-10

## 2025-03-10 NOTE — TELEPHONE ENCOUNTER
Received request via: Pharmacy    Was the patient seen in the last year in this department? No    Does the patient have an active prescription (recently filled or refills available) for medication(s) requested? No    [facility]     Does the patient have [facility] and need 100-day supply? (This applies to ALL medications) Patient does not have SCP

## 2025-03-24 ENCOUNTER — OFFICE VISIT (OUTPATIENT)
Dept: URGENT CARE | Facility: CLINIC | Age: 60
End: 2025-03-24
Payer: COMMERCIAL

## 2025-03-24 VITALS
HEART RATE: 104 BPM | HEIGHT: 70 IN | RESPIRATION RATE: 20 BRPM | WEIGHT: 184 LBS | BODY MASS INDEX: 26.34 KG/M2 | TEMPERATURE: 98.5 F | DIASTOLIC BLOOD PRESSURE: 70 MMHG | OXYGEN SATURATION: 95 % | SYSTOLIC BLOOD PRESSURE: 116 MMHG

## 2025-03-24 DIAGNOSIS — J01.90 ACUTE NON-RECURRENT SINUSITIS, UNSPECIFIED LOCATION: ICD-10-CM

## 2025-03-24 PROCEDURE — 3078F DIAST BP <80 MM HG: CPT | Performed by: PHYSICIAN ASSISTANT

## 2025-03-24 PROCEDURE — 3074F SYST BP LT 130 MM HG: CPT | Performed by: PHYSICIAN ASSISTANT

## 2025-03-24 PROCEDURE — 99213 OFFICE O/P EST LOW 20 MIN: CPT | Performed by: PHYSICIAN ASSISTANT

## 2025-03-24 RX ORDER — PREDNISONE 20 MG/1
TABLET ORAL
Qty: 8 TABLET | Refills: 0 | Status: SHIPPED | OUTPATIENT
Start: 2025-03-24

## 2025-03-24 ASSESSMENT — ENCOUNTER SYMPTOMS
COUGH: 0
FEVER: 0
CHILLS: 0

## 2025-03-24 ASSESSMENT — FIBROSIS 4 INDEX: FIB4 SCORE: 1.85

## 2025-03-24 NOTE — PROGRESS NOTES
Subjective     Lorne Garcia is a 59 y.o. male who presents with Congestion (X couple weeks, doesn't feel ill or sinus pain)    HPI:  Isma Garcia is a 59 y.o. male who presents today for evaluation of nasal congestion.  Says that he almost feels as though he has a nasal blockage for the past 2 weeks or so.  No runny nose.  No sore throat or fever.  No significant sinus pain or discomfort.  Says that he has had a sensation of nasal congestion and unable to breathe through his nose.  Tries to blow his nose nothing comes out but then occasionally he gets a very thick glob of mucus.  He has been using Afrin in the past 2 to 3 days but does not want to continue to use it.  Was noting some mild improvement only in one of his nostrils.  Also tried using an antihistamine which did not help as well as a dose of Sudafed which did not help.        Review of Systems   Constitutional:  Negative for chills and fever.   HENT:  Positive for congestion.    Respiratory:  Negative for cough.            PMH:  has a past medical history of Arthritis (11/09/2022), BCC (basal cell carcinoma), face, Cancer (HCC), Chickenpox, Chronic low back pain, ED (erectile dysfunction), Genital herpes, Gout, Hemochromatosis (06/01/2023), HTN, Insomnia, Shoulder pain, and Sleep apnea (09/2021).    He has no past medical history of Amharic measles.  MEDS:   Current Outpatient Medications:     amoxicillin-clavulanate (AUGMENTIN) 875-125 MG Tab, Take 1 Tablet by mouth 2 times a day for 7 days., Disp: 14 Tablet, Rfl: 0    predniSONE (DELTASONE) 20 MG Tab, Take 2 tabs once daily for 4 days, Disp: 8 Tablet, Rfl: 0    allopurinol (ZYLOPRIM) 300 MG Tab, TAKE 1 TABLET BY MOUTH EVERY DAY, Disp: 90 Tablet, Rfl: 0    lisinopril (PRINIVIL) 10 MG Tab, Take 1 Tablet by mouth every day., Disp: 90 Tablet, Rfl: 3    cyclobenzaprine (FLEXERIL) 10 mg Tab, Take 1 Tablet by mouth 3 times a day as needed., Disp: , Rfl:     triamcinolone acetonide (KENALOG) 0.1 %  Cream, APPLY AFFECTED AREA TWICE DAILY FOR ITCHING. NOT FOR FACE., Disp: , Rfl:     colchicine (COLCRYS) 0.6 MG Tab, Take 1 Tablet by mouth every day., Disp: 90 Tablet, Rfl: 3    tadalafil (CIALIS) 5 MG tablet, Take 1 Tablet by mouth every day., Disp: 90 Tablet, Rfl: 3    anastrozole (ARIMIDEX) 1 MG Tab, Take 1 mg by mouth every day., Disp: , Rfl:     TESTOSTERONE IM, Inject 0.7 mL into the shoulder, thigh, or buttocks every 7 days., Disp: , Rfl:     multivitamin Tab, Take 1 Tablet by mouth every day., Disp: , Rfl:     Glucosamine HCl (GLUCOSAMINE PO), Take 1 Tablet by mouth every day., Disp: , Rfl:     Omega-3 Fatty Acids (FISH OIL PO), Take 1 Tablet by mouth every day., Disp: , Rfl:     valACYclovir (VALTREX) 500 MG Tab, TAKE 1 TABLET BY MOUTH TWICE DAILY FOR 3 TO 5 DAYS FOR EACH HERPES FLARE UP, Disp: 90 Tablet, Rfl: 3    CLOMID 50 MG tablet, TAKE 1 TABLET BY MOUTH TWO TIMES PER WEEK, Disp: , Rfl:     indomethacin (INDOCIN) 50 MG Cap, Take 1 Capsule by mouth 3 times a day as needed (gout pain). Take with food., Disp: 60 Capsule, Rfl: 1    HYDROcodone-acetaminophen (NORCO) 7.5-325 MG tab, , Disp: , Rfl:   ALLERGIES: No Known Allergies  SURGHX:   Past Surgical History:   Procedure Laterality Date    NC OPEN IMPLTJ HPGLSL NRV NSTIM RA PG&RESPIR SENSOR Right 6/19/2023    Procedure: INSERTION OF HYPOGLOSSAL NERVE NEUROSTIMULATOR ELECTRODE AND GENERATOR AND BREATHING SENSOR ELECTRODE-RIGHT.;  Surgeon: Kamar Cormier M.D.;  Location: SURGERY SAME DAY [facility];  Service: Ent    NC DISE DYN EVAL SLEEP DISORDERED BREATHING FLX DX  11/28/2022    Procedure: EVALUATION OF SLEEP DISORDERED BREATHING BY EXAMINATION OF UPPER AIRWAY USING ENDOSCOPE;  Surgeon: Kamar Cormier M.D.;  Location: SURGERY SAME DAY [facility];  Service: Ent    RECOVERY  09/19/2012    Performed by [facility] at SURGERY SAME DAY [facility] ORS    NASAL RECONSTRUCTION  07/06/2009    Performed by CRISTINA MANZO at SURGERY SAME DAY [facility] ORS     "NASAL RECONSTRUCTION  12/01/2008    Performed by CRISTINA MANZO at SURGERY SAME DAY [facility] ORS    NASAL RECONSTRUCTION  10/20/2008    Performed by CRISTINA MANZO at SURGERY SAME DAY [facility]    FLAP GRAFT  10/20/2008    Performed by CRISTINA MANZO at SURGERY SAME DAY [facility]    NASAL RECONSTRUCTION  07/07/2008    Performed by CRISTINA MANZO at SURGERY SAME DAY [facility] ORS    LESION EXCISION GENERAL  05/12/2008    Performed by CRISTINA MANZO at SURGERY SAME DAY [facility]    LAMINOTOMY      LUMBAR FUSION ANTERIOR      OTHER NEUROLOGICAL SURG  9/2020    TONSILLECTOMY       SOCHX:  reports that he has never smoked. He has never used smokeless tobacco. He reports that he does not drink alcohol and does not use drugs.  FH: Family history was reviewed, no pertinent findings to report        Objective     /70 (BP Location: Left arm, Patient Position: Sitting, BP Cuff Size: Adult)   Pulse (!) 104   Temp 36.9 °C (98.5 °F) (Temporal)   Resp 20   Ht 1.778 m (5' 10\")   Wt 83.5 kg (184 lb)   SpO2 95%   BMI 26.40 kg/m²      Physical Exam  Constitutional:       Appearance: He is well-developed.   HENT:      Head: Normocephalic and atraumatic.      Right Ear: Tympanic membrane, ear canal and external ear normal.      Left Ear: Tympanic membrane, ear canal and external ear normal.      Nose: Mucosal edema and congestion present. No rhinorrhea.      Right Nostril: No foreign body or occlusion.      Left Nostril: No foreign body or occlusion.      Mouth/Throat:      Lips: Pink.      Mouth: Mucous membranes are moist.      Pharynx: Oropharynx is clear.   Eyes:      Conjunctiva/sclera: Conjunctivae normal.      Pupils: Pupils are equal, round, and reactive to light.   Cardiovascular:      Rate and Rhythm: Normal rate and regular rhythm.      Heart sounds: Normal heart sounds. No murmur heard.  Pulmonary:      Effort: Pulmonary effort is normal.      Breath sounds: Normal breath sounds. No wheezing. "   Musculoskeletal:      Cervical back: Normal range of motion.   Lymphadenopathy:      Cervical: No cervical adenopathy.   Skin:     General: Skin is warm and dry.      Capillary Refill: Capillary refill takes less than 2 seconds.   Neurological:      Mental Status: He is alert and oriented to person, place, and time.   Psychiatric:         Behavior: Behavior normal.         Judgment: Judgment normal.       Assessment & Plan     1. Acute non-recurrent sinusitis, unspecified location  - amoxicillin-clavulanate (AUGMENTIN) 875-125 MG Tab; Take 1 Tablet by mouth 2 times a day for 7 days.  Dispense: 14 Tablet; Refill: 0  - predniSONE (DELTASONE) 20 MG Tab; Take 2 tabs once daily for 4 days  Dispense: 8 Tablet; Refill: 0  - OTC cold/flu medications. Stop use of Afrin  -Supportive care also discussed to include the use of saline nasal rinses, steam inhalation, and the use of a cool-mist humidifier in the bedroom at night.          Differential Diagnosis, natural history, and supportive care discussed. Return to the Urgent Care or follow up with your PCP if symptoms fail to resolve, or for any new or worsening symptoms. Emergency room precautions discussed. Patient and/or family appears understanding of information.

## 2025-04-03 ENCOUNTER — HOSPITAL ENCOUNTER (OUTPATIENT)
Facility: MEDICAL CENTER | Age: 60
End: 2025-04-03
Attending: STUDENT IN AN ORGANIZED HEALTH CARE EDUCATION/TRAINING PROGRAM
Payer: COMMERCIAL

## 2025-04-03 ENCOUNTER — OFFICE VISIT (OUTPATIENT)
Dept: MEDICAL GROUP | Facility: MEDICAL CENTER | Age: 60
End: 2025-04-03
Payer: COMMERCIAL

## 2025-04-03 VITALS
SYSTOLIC BLOOD PRESSURE: 132 MMHG | HEART RATE: 100 BPM | DIASTOLIC BLOOD PRESSURE: 70 MMHG | BODY MASS INDEX: 26.2 KG/M2 | OXYGEN SATURATION: 95 % | TEMPERATURE: 99.3 F | HEIGHT: 70 IN | WEIGHT: 182.98 LBS

## 2025-04-03 DIAGNOSIS — Z76.0 MEDICATION REFILL: ICD-10-CM

## 2025-04-03 DIAGNOSIS — A60.00 GENITAL HERPES SIMPLEX, UNSPECIFIED SITE: ICD-10-CM

## 2025-04-03 DIAGNOSIS — I10 ESSENTIAL HYPERTENSION, BENIGN: ICD-10-CM

## 2025-04-03 DIAGNOSIS — M1A.0710 CHRONIC IDIOPATHIC GOUT INVOLVING TOE OF RIGHT FOOT WITHOUT TOPHUS: ICD-10-CM

## 2025-04-03 DIAGNOSIS — Z00.00 ENCOUNTER FOR PREVENTIVE CARE: ICD-10-CM

## 2025-04-03 DIAGNOSIS — M10.9 ACUTE GOUT, UNSPECIFIED CAUSE, UNSPECIFIED SITE: ICD-10-CM

## 2025-04-03 PROBLEM — G47.33 OBSTRUCTIVE SLEEP APNEA OF ADULT: Status: ACTIVE | Noted: 2024-08-20

## 2025-04-03 PROBLEM — G47.10 HYPERSOMNIA: Status: ACTIVE | Noted: 2024-08-20

## 2025-04-03 PROCEDURE — 99214 OFFICE O/P EST MOD 30 MIN: CPT | Performed by: STUDENT IN AN ORGANIZED HEALTH CARE EDUCATION/TRAINING PROGRAM

## 2025-04-03 PROCEDURE — 3078F DIAST BP <80 MM HG: CPT | Performed by: STUDENT IN AN ORGANIZED HEALTH CARE EDUCATION/TRAINING PROGRAM

## 2025-04-03 PROCEDURE — 80307 DRUG TEST PRSMV CHEM ANLYZR: CPT

## 2025-04-03 PROCEDURE — 3075F SYST BP GE 130 - 139MM HG: CPT | Performed by: STUDENT IN AN ORGANIZED HEALTH CARE EDUCATION/TRAINING PROGRAM

## 2025-04-03 RX ORDER — LISINOPRIL 10 MG/1
10 TABLET ORAL DAILY
Qty: 90 TABLET | Refills: 3 | Status: SHIPPED | OUTPATIENT
Start: 2025-04-03

## 2025-04-03 RX ORDER — MODAFINIL 100 MG/1
100 TABLET ORAL DAILY
COMMUNITY

## 2025-04-03 RX ORDER — INDOMETHACIN 50 MG/1
50 CAPSULE ORAL 3 TIMES DAILY PRN
Qty: 60 CAPSULE | Refills: 1 | Status: SHIPPED | OUTPATIENT
Start: 2025-04-03

## 2025-04-03 RX ORDER — COLCHICINE 0.6 MG/1
0.6 TABLET ORAL DAILY
Qty: 90 TABLET | Refills: 3 | Status: SHIPPED | OUTPATIENT
Start: 2025-04-03

## 2025-04-03 RX ORDER — VALACYCLOVIR HYDROCHLORIDE 500 MG/1
500 TABLET, FILM COATED ORAL DAILY
Qty: 90 TABLET | Refills: 0 | Status: SHIPPED | OUTPATIENT
Start: 2025-04-03 | End: 2025-07-02

## 2025-04-03 RX ORDER — ZALEPLON 5 MG/1
10 CAPSULE ORAL NIGHTLY PRN
COMMUNITY

## 2025-04-03 RX ORDER — ALLOPURINOL 300 MG/1
300 TABLET ORAL DAILY
Qty: 90 TABLET | Refills: 0 | Status: SHIPPED | OUTPATIENT
Start: 2025-04-03

## 2025-04-03 ASSESSMENT — ENCOUNTER SYMPTOMS
COUGH: 0
CHILLS: 0
PALPITATIONS: 0
FEVER: 0
HEMOPTYSIS: 0

## 2025-04-03 ASSESSMENT — FIBROSIS 4 INDEX: FIB4 SCORE: 1.85

## 2025-04-03 ASSESSMENT — PATIENT HEALTH QUESTIONNAIRE - PHQ9: CLINICAL INTERPRETATION OF PHQ2 SCORE: 0

## 2025-04-03 NOTE — LETTER
[facility]  Maria Elena Latif M.D.  [address]   [place] [address]  Fax: [phone]   Authorization for Release/Disclosure of   Protected Health Information   Name: ISMA GARCIA : 1965 SSN: [id number]   [address]  [place] [address] Phone:    There are no phone numbers on file.   I authorize the entity listed below to release/disclose the PHI below to:   [facility]/Maria Elena Latif M.D. and Parisa Anne M.D.   Provider or Entity Name:  [facility]   Address   City, State, [facility]   Phone:      Fax:     Reason for request: continuity of care   Information to be released:    [  ] LAST COLONOSCOPY,  including any PATH REPORT and follow-up  [  ] LAST FIT/COLOGUARD RESULT [  ] LAST DEXA  [  ] LAST MAMMOGRAM  [  ] LAST PAP  [  ] LAST LABS [  ] RETINA EXAM REPORT  [  ] IMMUNIZATION RECORDS  [  ] Release all info      [  ] Check here and initial the line next to each item to release ALL health information INCLUDING  _____ Care and treatment for drug and / or alcohol abuse  _____ HIV testing, infection status, or AIDS  _____ Genetic Testing    DATES OF SERVICE OR TIME PERIOD TO BE DISCLOSED: _____________  I understand and acknowledge that:  * This Authorization may be revoked at any time by you in writing, except if your health information has already been used or disclosed.  * Your health information that will be used or disclosed as a result of you signing this authorization could be re-disclosed by the recipient. If this occurs, your re-disclosed health information may no longer be protected by State or Federal laws.  * You may refuse to sign this Authorization. Your refusal will not affect your ability to obtain treatment.  * This Authorization becomes effective upon signing and will  on (date) __________.      If no date is indicated, this Authorization will  one (1) year from the signature date.    Name: Isma Garcia  Signature: Date:   4/3/2025      PLEASE FAX REQUESTED RECORDS BACK TO: [phone]

## 2025-04-03 NOTE — PROGRESS NOTES
"Verbal consent was acquired by the patient to use [employer] ambient listening note generation during this visit ***    Subjective:     HPI:   History of Present Illness      Health Maintenance: {COMPLETED:641280}    Objective:     Exam:  /70 (BP Location: Left arm, Patient Position: Sitting, BP Cuff Size: Adult)   Pulse 100   Temp 37.4 °C (99.3 °F) (Temporal)   Ht 1.765 m (5' 9.5\")   Wt 83 kg (182 lb 15.7 oz)   SpO2 95%   BMI 26.63 kg/m²  Body mass index is 26.63 kg/m².    @ROS  Physical Exam    {A chaperone was offered to the patient during today's exam.:53678}    Lab studies    Assessment & Plan:     Assessment & Plan      Problem List Items Addressed This Visit          Family Medicine Problems    Essential hypertension, benign    Relevant Medications    lisinopril (PRINIVIL) 10 MG Tab    Other Relevant Orders    Comp Metabolic Panel       Other    Gout    Relevant Medications    allopurinol (ZYLOPRIM) 300 MG Tab    colchicine (COLCRYS) 0.6 MG Tab    indomethacin (INDOCIN) 50 MG Cap    Genital herpes    Relevant Medications    valACYclovir (VALTREX) 500 MG Tab     Other Visit Diagnoses         Medication refill        Relevant Orders    URINE DRUG SCREEN    Controlled Substance Treatment Agreement      Encounter for preventive care        Relevant Orders    CBC WITH DIFFERENTIAL    VITAMIN D,25 HYDROXY (DEFICIENCY)    HEMOGLOBIN A1C    Lipid Profile                No follow-ups on file.    Please note that this dictation was created using voice recognition software. I have made every reasonable attempt to correct obvious errors, but I expect that there are errors of grammar and possibly content that I did not discover before finalizing the note.        "

## 2025-04-04 NOTE — ASSESSMENT & PLAN NOTE
- UDS and controlled substance agreement on file.  If he runs out of modafinil and zaleplon before his appointment in June he will contact me

## 2025-04-04 NOTE — PROGRESS NOTES
Isma Garcia is a 59 y.o. old male  who is here for medication refill    Chief Complaint   Patient presents with    Medication Refill     valACYclovir (VALTREX) 500 MG Tab, lisinopril (PRINIVIL) 10 MG Tab, allopurinol (ZYLOPRIM) 300 MG Tab, CLOMID 50 MG tablet, colchicine (COLCRYS) 0.6 MG Tab, modafinil 100 mg tablet, indomethacin (INDOCIN) 50 MG Cap, zaleplon (sonata) 5 mg capsule        HPI  Patient is here for medication refills.  Patient takes modafinil and zaleplon for sleep disorder which is prescribed by his sleep clinic.  He has been traveling frequently and was unable to make appointment with his sleep medicine.  He reports that he has enough medication for modafinil and zaleplon at this time.  He is currently undergoing treatment for basal cell carcinoma.  He takes testosterone, Clomid and anastrozole for which she follows urology.  Problem   Medication Refill   Obstructive Sleep Apnea of Adult   Hypersomnia   Genital Herpes   Essential Hypertension, Benign           Review of Systems   Constitutional:  Negative for chills and fever.   Respiratory:  Negative for cough and hemoptysis.    Cardiovascular:  Negative for chest pain and palpitations.        She  has a past medical history of Arthritis (11/09/2022), BCC (basal cell carcinoma), face, Cancer (HCC), Chickenpox, Chronic low back pain, ED (erectile dysfunction), Genital herpes, Gout, Hemochromatosis (06/01/2023), HTN, Insomnia, Shoulder pain, and Sleep apnea (09/2021).    He has no past medical history of Citizen of [place] measles.  She  has a past surgical history that includes lesion excision general (05/12/2008); nasal reconstruction (07/07/2008); nasal reconstruction (10/20/2008); flap graft (10/20/2008); nasal reconstruction (12/01/2008); laminotomy; nasal reconstruction (07/06/2009); recovery (09/19/2012); tonsillectomy; lumbar fusion anterior; pr dise dyn eval sleep disordered breathing flx dx (11/28/2022); other neurological surg (9/2020); and pr  open impltj hpglsl nrv nstim ra pg&respir sensor (Right, 6/19/2023).  Family History   Problem Relation Age of Onset    Cancer Father 78        colon    Hypertension Father     Diabetes Sister         Type 1 DM    Sleep Apnea Neg Hx      Social History     Tobacco Use    Smoking status: Never    Smokeless tobacco: Never   Vaping Use    Vaping status: Never Used   Substance Use Topics    Alcohol use: No    Drug use: No     Patient Active Problem List    Diagnosis Date Noted    Medication refill 04/03/2025    Obstructive sleep apnea of adult 08/20/2024    Hypersomnia 08/20/2024    Hypogonadism, male 09/10/2021    Primary osteoarthritis of both shoulders 08/18/2021    Shift work sleep disorder 08/18/2021    Genital herpes 08/18/2021    Erectile dysfunction 01/10/2013    Left anterior fascicular block 01/10/2013    Essential hypertension, benign 01/10/2013    Hereditary hemochromatosis (HCC) 09/19/2012    Gout     Chronic low back pain with bilateral sciatica     History of basal cell carcinoma (BCC)      Current Outpatient Medications   Medication Sig Dispense Refill    zaleplon (SONATA) 5 MG capsule Take 10 mg by mouth at bedtime as needed for Insomnia.      modafinil (PROVIGIL) 100 MG Tab Take 100 mg by mouth every day.      valACYclovir (VALTREX) 500 MG Tab Take 1 Tablet by mouth every day for 90 days. 90 Tablet 0    lisinopril (PRINIVIL) 10 MG Tab Take 1 Tablet by mouth every day. 90 Tablet 3    allopurinol (ZYLOPRIM) 300 MG Tab Take 1 Tablet by mouth every day. 90 Tablet 0    colchicine (COLCRYS) 0.6 MG Tab Take 1 Tablet by mouth every day. 90 Tablet 3    indomethacin (INDOCIN) 50 MG Cap Take 1 Capsule by mouth 3 times a day as needed (gout pain). Take with food. 60 Capsule 1    anastrozole (ARIMIDEX) 1 MG Tab Take 1 mg by mouth every day.      TESTOSTERONE IM Inject 0.7 mL into the shoulder, thigh, or buttocks every 7 days.      multivitamin Tab Take 1 Tablet by mouth every day.      Glucosamine HCl (GLUCOSAMINE  "PO) Take 1 Tablet by mouth every day.      Omega-3 Fatty Acids (FISH OIL PO) Take 1 Tablet by mouth every day.      CLOMID 50 MG tablet TAKE 1 TABLET BY MOUTH TWO TIMES PER WEEK       No current facility-administered medications for this visit.    (including changes today)  Allergies: Patient has no known allergies.    /70 (BP Location: Left arm, Patient Position: Sitting, BP Cuff Size: Adult)   Pulse 100   Temp 37.4 °C (99.3 °F) (Temporal)   Ht 1.765 m (5' 9.5\")   Wt 83 kg (182 lb 15.7 oz)   SpO2 95%      Physical Exam  Constitutional:       Appearance: Normal appearance.   Cardiovascular:      Rate and Rhythm: Normal rate and regular rhythm.      Pulses: Normal pulses.      Heart sounds: Normal heart sounds. No murmur heard.  Pulmonary:      Effort: Pulmonary effort is normal. No respiratory distress.      Breath sounds: Normal breath sounds. No wheezing.   Neurological:      General: No focal deficit present.      Mental Status: He is alert and oriented to person, place, and time.        Lab Results   Component Value Date/Time    SODIUM 137 03/29/2024 05:54 AM    SODIUM 139 06/08/2023 10:24 AM    POTASSIUM 4.6 03/29/2024 05:54 AM    POTASSIUM 5.0 06/08/2023 10:24 AM    CHLORIDE 103 03/29/2024 05:54 AM    CHLORIDE 103 06/08/2023 10:24 AM    CO2 21 03/29/2024 05:54 AM    CO2 24 06/08/2023 10:24 AM    GLUCOSE 94 03/29/2024 05:54 AM    GLUCOSE 97 06/08/2023 10:24 AM    BUN 17 03/29/2024 05:54 AM    BUN 19 06/08/2023 10:24 AM    CREATININE 1.01 03/29/2024 05:54 AM    CREATININE 1.12 06/08/2023 10:24 AM    CREATININE 1.2 12/01/2008 09:09 AM    BUNCREATRAT 17 03/29/2024 05:54 AM          Assessment and plan:    Problem List Items Addressed This Visit          Family Medicine Problems    Essential hypertension, benign    Stable.  Continue lisinopril 10 mg daily         Relevant Medications    lisinopril (PRINIVIL) 10 MG Tab    Other Relevant Orders    Comp Metabolic Panel       Other    Gout    Relevant " Medications    allopurinol (ZYLOPRIM) 300 MG Tab    colchicine (COLCRYS) 0.6 MG Tab    indomethacin (INDOCIN) 50 MG Cap    Genital herpes    - Continue valaciclovir 500 mg daily         Relevant Medications    valACYclovir (VALTREX) 500 MG Tab    Medication refill    - UDS and controlled substance agreement on file.  If he runs out of modafinil and zaleplon before his appointment in June he will contact me         Relevant Orders    URINE DRUG SCREEN    Controlled Substance Treatment Agreement     Other Visit Diagnoses         Encounter for preventive care        Relevant Orders    CBC WITH DIFFERENTIAL    VITAMIN D,25 HYDROXY (DEFICIENCY)    HEMOGLOBIN A1C    Lipid Profile               Return in about 2 months (around 6/3/2025) for Labfollowup.        Please note that this dictation was created using voice recognition software. I have made every reasonable attempt to correct obvious errors, but I expect that there are errors of grammar and possibly content that I did not discover before finalizing the note.

## 2025-04-05 LAB
AMPHET CTO UR CFM-MCNC: NEGATIVE NG/ML
BARBITURATES CTO UR CFM-MCNC: NEGATIVE NG/ML
BENZODIAZ CTO UR CFM-MCNC: NEGATIVE NG/ML
CANNABINOIDS CTO UR CFM-MCNC: NEGATIVE NG/ML
COCAINE CTO UR CFM-MCNC: NEGATIVE NG/ML
CREAT UR-MCNC: 72.2 MG/DL (ref 20–400)
DRUG COMMENT 753798: NORMAL
METHADONE CTO UR CFM-MCNC: NEGATIVE NG/ML
OPIATES CTO UR CFM-MCNC: NEGATIVE NG/ML
PCP CTO UR CFM-MCNC: NEGATIVE NG/ML
PROPOXYPH CTO UR CFM-MCNC: NEGATIVE NG/ML

## 2025-05-15 LAB
25(OH)D3+25(OH)D2 SERPL-MCNC: 86.6 NG/ML (ref 30–100)
ALBUMIN SERPL-MCNC: 4.3 G/DL (ref 3.8–4.9)
ALP SERPL-CCNC: 84 IU/L (ref 44–121)
ALT SERPL-CCNC: 29 IU/L (ref 0–44)
AST SERPL-CCNC: 29 IU/L (ref 0–40)
BASOPHILS # BLD AUTO: 0 X10E3/UL (ref 0–0.2)
BASOPHILS NFR BLD AUTO: 1 %
BILIRUB SERPL-MCNC: 0.5 MG/DL (ref 0–1.2)
BUN SERPL-MCNC: 18 MG/DL (ref 8–27)
BUN/CREAT SERPL: 16 (ref 10–24)
CALCIUM SERPL-MCNC: 9.1 MG/DL (ref 8.6–10.2)
CHLORIDE SERPL-SCNC: 103 MMOL/L (ref 96–106)
CHOLEST SERPL-MCNC: 144 MG/DL (ref 100–199)
CO2 SERPL-SCNC: 22 MMOL/L (ref 20–29)
CREAT SERPL-MCNC: 1.16 MG/DL (ref 0.76–1.27)
EGFRCR SERPLBLD CKD-EPI 2021: 72 ML/MIN/1.73
EOSINOPHIL # BLD AUTO: 0.1 X10E3/UL (ref 0–0.4)
EOSINOPHIL NFR BLD AUTO: 2 %
ERYTHROCYTE [DISTWIDTH] IN BLOOD BY AUTOMATED COUNT: 13.8 % (ref 11.6–15.4)
GLOBULIN SER CALC-MCNC: 2.3 G/DL (ref 1.5–4.5)
GLUCOSE SERPL-MCNC: 91 MG/DL (ref 70–99)
HBA1C MFR BLD: 5.1 % (ref 4.8–5.6)
HCT VFR BLD AUTO: 56.8 % (ref 37.5–51)
HDLC SERPL-MCNC: 57 MG/DL
HGB BLD-MCNC: 19 G/DL (ref 13–17.7)
IMM GRANULOCYTES # BLD AUTO: 0 X10E3/UL (ref 0–0.1)
IMM GRANULOCYTES NFR BLD AUTO: 1 %
IMMATURE CELLS  115398: ABNORMAL
LDL CALC COMMENT:: NORMAL
LDLC SERPL CALC-MCNC: 74 MG/DL (ref 0–99)
LYMPHOCYTES # BLD AUTO: 1.4 X10E3/UL (ref 0.7–3.1)
LYMPHOCYTES NFR BLD AUTO: 32 %
MCH RBC QN AUTO: 36.1 PG (ref 26.6–33)
MCHC RBC AUTO-ENTMCNC: 33.5 G/DL (ref 31.5–35.7)
MCV RBC AUTO: 108 FL (ref 79–97)
MONOCYTES # BLD AUTO: 0.5 X10E3/UL (ref 0.1–0.9)
MONOCYTES NFR BLD AUTO: 11 %
MORPHOLOGY BLD-IMP: ABNORMAL
NEUTROPHILS # BLD AUTO: 2.3 X10E3/UL (ref 1.4–7)
NEUTROPHILS NFR BLD AUTO: 53 %
NRBC BLD AUTO-RTO: ABNORMAL %
PLATELET # BLD AUTO: 186 X10E3/UL (ref 150–450)
POTASSIUM SERPL-SCNC: 4.6 MMOL/L (ref 3.5–5.2)
PROT SERPL-MCNC: 6.6 G/DL (ref 6–8.5)
RBC # BLD AUTO: 5.27 X10E6/UL (ref 4.14–5.8)
SODIUM SERPL-SCNC: 138 MMOL/L (ref 134–144)
TRIGL SERPL-MCNC: 65 MG/DL (ref 0–149)
VLDLC SERPL CALC-MCNC: 13 MG/DL (ref 5–40)
WBC # BLD AUTO: 4.3 X10E3/UL (ref 3.4–10.8)

## 2025-06-02 ENCOUNTER — APPOINTMENT (OUTPATIENT)
Dept: MEDICAL GROUP | Facility: MEDICAL CENTER | Age: 60
End: 2025-06-02
Payer: COMMERCIAL

## 2025-06-02 VITALS
DIASTOLIC BLOOD PRESSURE: 76 MMHG | HEART RATE: 89 BPM | OXYGEN SATURATION: 97 % | WEIGHT: 186.4 LBS | BODY MASS INDEX: 26.69 KG/M2 | HEIGHT: 70 IN | TEMPERATURE: 97.9 F | SYSTOLIC BLOOD PRESSURE: 124 MMHG

## 2025-06-02 DIAGNOSIS — E83.110 HEREDITARY HEMOCHROMATOSIS (HCC): ICD-10-CM

## 2025-06-02 DIAGNOSIS — I10 ESSENTIAL HYPERTENSION, BENIGN: Primary | ICD-10-CM

## 2025-06-02 DIAGNOSIS — M1A.0710 CHRONIC IDIOPATHIC GOUT INVOLVING TOE OF RIGHT FOOT WITHOUT TOPHUS: ICD-10-CM

## 2025-06-02 PROBLEM — G47.25 JET LAG: Status: ACTIVE | Noted: 2025-03-10

## 2025-06-02 PROCEDURE — 99214 OFFICE O/P EST MOD 30 MIN: CPT | Performed by: STUDENT IN AN ORGANIZED HEALTH CARE EDUCATION/TRAINING PROGRAM

## 2025-06-02 PROCEDURE — 3074F SYST BP LT 130 MM HG: CPT | Performed by: STUDENT IN AN ORGANIZED HEALTH CARE EDUCATION/TRAINING PROGRAM

## 2025-06-02 PROCEDURE — 3078F DIAST BP <80 MM HG: CPT | Performed by: STUDENT IN AN ORGANIZED HEALTH CARE EDUCATION/TRAINING PROGRAM

## 2025-06-02 ASSESSMENT — ENCOUNTER SYMPTOMS
BLURRED VISION: 0
COUGH: 0
CHILLS: 0
HEMOPTYSIS: 0
PALPITATIONS: 0
FEVER: 0

## 2025-06-02 ASSESSMENT — FIBROSIS 4 INDEX: FIB4 SCORE: 1.74

## 2025-06-03 NOTE — ASSESSMENT & PLAN NOTE
- Managed by donating blood every 8 weeks.  - No additional treatment required.  - Last donation was approximately 2 weeks ago.  - Previously managed with frequent phlebotomies under  hematologist care.

## 2025-06-03 NOTE — PROGRESS NOTES
"Verbal consent was acquired by the patient to use [employer] ambient listening note generation during this visit     Subjective:     HPI:   History of Present Illness  The patient presents to [facility] care.    He reports no alterations in his medication regimen since the last consultation. He has been compliant with his blood pressure medication, which he took today. He does not require any refills for Zofran or modafinil, having refilled them in [place] in 03/2025 and 09/2024 respectively. He uses modafinil sparingly but plans to utilize it during an upcoming trip on 06/14/2025. He has sufficient refills for his travel period. His testosterone prescription is managed by Dr. Jose Lau. He reports no chest pain, palpitations, abdominal pain, nausea, vomiting, loose stools, blurry vision, changes in vision, ear pain, or ear discharge.    He has a diagnosis of central sleep apnea, confirmed by a sleep study, but remains asymptomatic.  He has an Inspire implant for this condition.    He also has hemochromatosis, for which he donates blood every 8 weeks as a form of treatment. His last donation was approximately 2 weeks ago. Initially, he underwent frequent phlebotomies under the care of a gastroenterologist and a hematologist in [place], but it was later determined that occasional blood donation would suffice.    His gout is well-managed with colchicine during flare-ups and daily allopurinol to control uric acid levels. He also takes indomethacin during attacks, which occur every few months.      Objective:     Exam:  /76 (BP Location: Left arm, Patient Position: Sitting, BP Cuff Size: Adult)   Pulse 89   Temp 36.6 °C (97.9 °F) (Temporal)   Ht 1.765 m (5' 9.5\")   Wt 84.5 kg (186 lb 6.4 oz)   SpO2 97%   BMI 27.13 kg/m²  Body mass index is 27.13 kg/m².    Review of Systems   Constitutional:  Negative for chills and fever.   Eyes:  Negative for blurred vision.   Respiratory:  Negative for cough and hemoptysis. "    Cardiovascular:  Negative for chest pain and palpitations.       Physical Exam  Constitutional:       Appearance: Normal appearance.   Cardiovascular:      Rate and Rhythm: Normal rate and regular rhythm.      Pulses: Normal pulses.      Heart sounds: Normal heart sounds. No murmur heard.  Pulmonary:      Effort: Pulmonary effort is normal. No respiratory distress.      Breath sounds: No wheezing.   Abdominal:      Palpations: Abdomen is soft.      Tenderness: There is no abdominal tenderness.   Neurological:      General: No focal deficit present.      Mental Status: He is alert and oriented to person, place, and time.           Assessment & Plan:     Assessment & Plan      Problem List Items Addressed This Visit          Family Medicine Problems    Essential hypertension, benign - Primary    Continue lisinopril 10 mg daily            Other    Gout    - Takes allopurinol daily to control uric acid levels.  - Uses colchicine and indomethacin as needed for flare-ups.  - Frequency of attacks is approximately every couple of months.  - No recent increase in frequency of attacks.         Hereditary hemochromatosis (HCC)    - Managed by donating blood every 8 weeks.  - No additional treatment required.  - Last donation was approximately 2 weeks ago.  - Previously managed with frequent phlebotomies under  hematologist care.                Return if symptoms worsen or fail to improve.    Please note that this dictation was created using voice recognition software. I have made every reasonable attempt to correct obvious errors, but I expect that there are errors of grammar and possibly content that I did not discover before finalizing the note.

## 2025-06-03 NOTE — ASSESSMENT & PLAN NOTE
- Takes allopurinol daily to control uric acid levels.  - Uses colchicine and indomethacin as needed for flare-ups.  - Frequency of attacks is approximately every couple of months.  - No recent increase in frequency of attacks.

## 2025-07-11 DIAGNOSIS — A60.00 GENITAL HERPES SIMPLEX, UNSPECIFIED SITE: ICD-10-CM

## 2025-07-11 NOTE — TELEPHONE ENCOUNTER
Received request via: Pharmacy    Was the patient seen in the last year in this department? Yes    Does the patient have an active prescription (recently filled or refills available) for medication(s) requested? No    Pharmacy Name: [facility]     Does the patient have care home Plus and need 100-day supply? (This applies to ALL medications) Patient does not have SCP

## 2025-07-14 RX ORDER — VALACYCLOVIR HYDROCHLORIDE 500 MG/1
500 TABLET, FILM COATED ORAL
Qty: 90 TABLET | Refills: 0 | Status: SHIPPED | OUTPATIENT
Start: 2025-07-14

## 2025-08-11 DIAGNOSIS — M1A.0710 CHRONIC IDIOPATHIC GOUT INVOLVING TOE OF RIGHT FOOT WITHOUT TOPHUS: ICD-10-CM

## 2025-08-12 RX ORDER — ALLOPURINOL 300 MG/1
300 TABLET ORAL DAILY
Qty: 90 TABLET | Refills: 0 | Status: SHIPPED | OUTPATIENT
Start: 2025-08-12

## 2025-08-26 ENCOUNTER — HOSPITAL ENCOUNTER (OUTPATIENT)
Facility: MEDICAL CENTER | Age: 60
End: 2025-08-26
Attending: PATHOLOGY
Payer: COMMERCIAL

## 2025-08-26 LAB
APTT PPP: 27.7 SEC (ref 24.7–36)
ERYTHROCYTE [DISTWIDTH] IN BLOOD BY AUTOMATED COUNT: 53.1 FL (ref 35.9–50)
HCT VFR BLD AUTO: 53.8 % (ref 42–52)
HGB BLD-MCNC: 18.4 G/DL (ref 14–18)
INR PPP: 1.02 (ref 0.87–1.13)
MCH RBC QN AUTO: 36.1 PG (ref 27–33)
MCHC RBC AUTO-ENTMCNC: 34.2 G/DL (ref 32.3–36.5)
MCV RBC AUTO: 105.7 FL (ref 81.4–97.8)
PLATELET # BLD AUTO: 181 K/UL (ref 164–446)
PMV BLD AUTO: 9.4 FL (ref 9–12.9)
PROTHROMBIN TIME: 13.5 SEC (ref 12–14.6)
RBC # BLD AUTO: 5.09 M/UL (ref 4.7–6.1)
WBC # BLD AUTO: 5.5 K/UL (ref 4.8–10.8)

## (undated) DEVICE — KIT  I.V. START (100EA/CA)

## (undated) DEVICE — SPONGE PEANUT - (5/PK 50PK/CA)

## (undated) DEVICE — SET LEADWIRE 5 LEAD BEDSIDE DISPOSABLE ECG (1SET OF 5/EA)

## (undated) DEVICE — CANISTER SUCTION RIGID RED 1500CC (40EA/CA)

## (undated) DEVICE — CANISTER SUCTION 3000ML MECHANICAL FILTER AUTO SHUTOFF MEDI-VAC NONSTERILE LF DISP  (40EA/CA)

## (undated) DEVICE — DRESSING TRANSPARENT FILM TEGADERM 2.375 X 2.75"  (100EA/BX)"

## (undated) DEVICE — TUBE CONNECTING SUCTION - CLEAR PLASTIC STERILE 72 IN (50EA/CA)

## (undated) DEVICE — SUTURE 3-0 PROLENE SH 30 (36PK/BX)"

## (undated) DEVICE — WATER IRRIGATION STERILE 1000ML (12EA/CA)

## (undated) DEVICE — SUTURE 3-0 SILK RB-1 C/R (12/BX)

## (undated) DEVICE — TUBE E-T HI-LO CUFF 8.0MM (10EA/PK)

## (undated) DEVICE — BOVIE BLADE COATED &INSULATED - 25/PK

## (undated) DEVICE — SUCTION INSTRUMENT YANKAUER BULBOUS TIP W/O VENT (50EA/CA)

## (undated) DEVICE — SENSOR OXIMETER ADULT SPO2 RD SET (20EA/BX)

## (undated) DEVICE — PROBE KARTUSH BIPOL STIMULATI (5EA/PK)

## (undated) DEVICE — DRAPE IOBAN II INCISE 23X17 - (10EA/BX 4BX/CA)

## (undated) DEVICE — GOWN WARMING STANDARD FLEX - (30/CA)

## (undated) DEVICE — PASSER CATHETER 38 CM

## (undated) DEVICE — SLEEVE VASO CALF MED - (10PR/CA)

## (undated) DEVICE — DRAPESURG STERI-DRAPE LONG - (10/BX 4BX/CA)

## (undated) DEVICE — BRONCHOSCOPE 4 SLIM 3.8/1.2 (5EA/CA)

## (undated) DEVICE — ELECTRODE EMG PAIRED MEDTRONIC XOMED PRASS 18MM (5EA/PK)

## (undated) DEVICE — PACK ENT OR - (2EA/CA)

## (undated) DEVICE — TUBING CLEARLINK DUO-VENT - C-FLO (48EA/CA)

## (undated) DEVICE — ELECTRODES PAIRED NEEDLE - (1/BX)

## (undated) DEVICE — TOWELS CLOTH SURGICAL - (4/PK 20PK/CA)

## (undated) DEVICE — CATHETER IV SAFETY 14 GA X 2 IN (200/CA)

## (undated) DEVICE — GLOVE BIOGEL SZ 7.5 SURGICAL PF LTX - (50PR/BX 4BX/CA)

## (undated) DEVICE — SUTURE 2-0 SILK SH C/R ETHICON (12PK/BX)

## (undated) DEVICE — DRAPE MICROSCOPE ARMATEC 120IN X 46IN (10EA/CA)

## (undated) DEVICE — SODIUM CHL IRRIGATION 0.9% 1000ML (12EA/CA)

## (undated) DEVICE — TOWEL STOP TIMEOUT SAFETY FLAG (40EA/CA)

## (undated) DEVICE — LACTATED RINGERS INJ 1000 ML - (14EA/CA 60CA/PF)

## (undated) DEVICE — PEN SKIN MARKER W/RULER - (50EA/BX)

## (undated) DEVICE — SUTURE GENERAL

## (undated) DEVICE — BLADE SURGICAL #15 - (50/BX 3BX/CA)

## (undated) DEVICE — CANNULA W/ SUPPLY TUBING O2 - (50/CA)

## (undated) DEVICE — REMOTE PATIENT SLEEP INSPIRE (1EA)

## (undated) DEVICE — DEPRESSOR TONGUE ADLT STERILE - 6 IN (100EA/BX)

## (undated) DEVICE — ANTI-FOG SOLUTION - 60BTL/CA

## (undated) DEVICE — CANNULA O2 COMFORT SOFT EAR ADULT 7 FT TUBING (50/CA)

## (undated) DEVICE — CORDS BIPOLAR COAGULATION - 12FT STERILE DISP. (10EA/BX)

## (undated) DEVICE — MASK OXYGEN VNYL ADLT MED CONC WITH 7 FOOT TUBING  - (50EA/CA)

## (undated) DEVICE — SUTURE 3-0 VICRYL PLUS SH - 8X 18 INCH (12/BX)

## (undated) DEVICE — TRAY SRGPRP PVP IOD WT PRP - (20/CA)

## (undated) DEVICE — COVER CIV-FLEX TRANSDUCER - (24/BX)

## (undated) DEVICE — SUTURE 4-0 MONOCRYL PLUS PS-2 - 27 INCH (36/BX)

## (undated) DEVICE — SYRINGE 10 ML CONTROL LL (25EA/BX 4BX/CA)